# Patient Record
Sex: FEMALE | Race: BLACK OR AFRICAN AMERICAN | NOT HISPANIC OR LATINO | Employment: PART TIME | URBAN - METROPOLITAN AREA
[De-identification: names, ages, dates, MRNs, and addresses within clinical notes are randomized per-mention and may not be internally consistent; named-entity substitution may affect disease eponyms.]

---

## 2017-08-31 ENCOUNTER — HOSPITAL ENCOUNTER (EMERGENCY)
Facility: HOSPITAL | Age: 47
Discharge: HOME | End: 2017-08-31
Payer: COMMERCIAL

## 2017-08-31 VITALS
TEMPERATURE: 98 F | WEIGHT: 195 LBS | DIASTOLIC BLOOD PRESSURE: 95 MMHG | OXYGEN SATURATION: 97 % | HEIGHT: 62 IN | SYSTOLIC BLOOD PRESSURE: 170 MMHG | RESPIRATION RATE: 17 BRPM | HEART RATE: 85 BPM | BODY MASS INDEX: 35.88 KG/M2

## 2017-08-31 DIAGNOSIS — L25.9 CONTACT DERMATITIS, UNSPECIFIED CONTACT DERMATITIS TYPE, UNSPECIFIED TRIGGER: ICD-10-CM

## 2017-08-31 DIAGNOSIS — B35.8 TINEA FACIALE: Primary | ICD-10-CM

## 2017-08-31 PROCEDURE — 99283 EMERGENCY DEPT VISIT LOW MDM: CPT

## 2017-08-31 PROCEDURE — 63600175 PHARM REV CODE 636 W HCPCS: Performed by: NURSE PRACTITIONER

## 2017-08-31 PROCEDURE — 25000003 PHARM REV CODE 250: Performed by: NURSE PRACTITIONER

## 2017-08-31 RX ORDER — PREDNISONE 20 MG/1
20 TABLET ORAL
Status: COMPLETED | OUTPATIENT
Start: 2017-08-31 | End: 2017-08-31

## 2017-08-31 RX ORDER — PREDNISONE 20 MG/1
40 TABLET ORAL DAILY
Qty: 10 TABLET | Refills: 0 | Status: SHIPPED | OUTPATIENT
Start: 2017-08-31 | End: 2017-09-05

## 2017-08-31 RX ORDER — METFORMIN HYDROCHLORIDE 1000 MG/1
1000 TABLET ORAL 2 TIMES DAILY WITH MEALS
COMMUNITY

## 2017-08-31 RX ORDER — LISINOPRIL 30 MG/1
30 TABLET ORAL DAILY
COMMUNITY
End: 2018-11-29

## 2017-08-31 RX ORDER — TOLNAFTATE 10 MG/G
CREAM TOPICAL 2 TIMES DAILY
Qty: 28 G | Refills: 0 | Status: SHIPPED | OUTPATIENT
Start: 2017-08-31 | End: 2019-10-08

## 2017-08-31 RX ORDER — DIPHENHYDRAMINE HCL 25 MG
25 CAPSULE ORAL
Status: COMPLETED | OUTPATIENT
Start: 2017-08-31 | End: 2017-08-31

## 2017-08-31 RX ADMIN — DIPHENHYDRAMINE HYDROCHLORIDE 25 MG: 25 CAPSULE ORAL at 01:08

## 2017-08-31 RX ADMIN — PREDNISONE 20 MG: 20 TABLET ORAL at 01:08

## 2017-08-31 NOTE — ED NOTES
Patient identifiers verified and correct for Dixie Schultz.    LOC: The patient is awake, alert and aware of environment with an appropriate affect, the patient is oriented x 3 and speaking appropriately.  APPEARANCE: Patient resting comfortably and in no acute distress, patient is clean and well groomed, patient's clothing is properly fastened.  SKIN: The skin is warm and dry, color consistent with ethnicity, patient has normal skin turgor and moist mucus membranes, skin intact, no breakdown or bruising noted. Rash noted to the left side of the patients face.

## 2017-08-31 NOTE — ED PROVIDER NOTES
"Encounter Date: 8/31/2017       History     Chief Complaint   Patient presents with    rash to face     "I started with a rash to the left side of my face yesterday after wearing a headset at work, and today the rash is worse and spreading" c/o itching     42-year-old female reports rash around left ear from wearing gets it at work.  Patient states the area has been itching and red and seems to be spreading.  States several other coworkers were the same headset and have a similar rash.  Denies any ear pain.  Has not taken any medications for symptoms.  Denies any fever.          Review of patient's allergies indicates:  No Known Allergies  Past Medical History:   Diagnosis Date    Anemia     Diabetes mellitus     Hypertension      No past surgical history on file.  No family history on file.  Social History   Substance Use Topics    Smoking status: Never Smoker    Smokeless tobacco: Not on file    Alcohol use No     Review of Systems   Constitutional: Negative for fever.   HENT: Negative for sore throat.    Respiratory: Negative for shortness of breath.    Cardiovascular: Negative for chest pain.   Gastrointestinal: Negative for nausea.   Genitourinary: Negative for dysuria.   Musculoskeletal: Negative for back pain.   Skin: Positive for rash (allergic reaction to face).   Neurological: Negative for weakness.   Hematological: Does not bruise/bleed easily.   All other systems reviewed and are negative.      Physical Exam     Initial Vitals [08/31/17 1253]   BP Pulse Resp Temp SpO2   (!) 170/95 85 17 98 °F (36.7 °C) 97 %      MAP       120         Physical Exam    HENT:   Head:       Right Ear: Tympanic membrane and ear canal normal.   Left Ear: Tympanic membrane and ear canal normal.         ED Course   Procedures  Labs Reviewed - No data to display          Medical Decision Making:   Initial Assessment:   42-year-old female reports rash around left ear from wearing gets it at work.  Patient states the area " has been itching and red and seems to be spreading.  States several other coworkers were the same headset and have a similar rash.  Denies any ear pain.  Has not taken any medications for symptoms.  Denies any fever.  Hypopigmented erythematous papular rash to the left side of face.  TMs are normal  Differential Diagnosis:   ALLERGIC dermatitis, contact dermatitis, tinea infection  ED Management:  I will treat the patient with the antifungal cream and Benadryl and steroids.  Instructed to take medication as prescribed.  I also instructed the patient to clean the headset at work to prevent the spread of this infection.  Patient verbalized understanding.                   ED Course     Clinical Impression:   The primary encounter diagnosis was Tinea faciale. A diagnosis of Contact dermatitis, unspecified contact dermatitis type, unspecified trigger was also pertinent to this visit.    Disposition:   Disposition: Discharged  Condition: Stable                        Rebekah Otero NP  08/31/17 1864

## 2017-09-27 ENCOUNTER — HOSPITAL ENCOUNTER (EMERGENCY)
Facility: HOSPITAL | Age: 47
Discharge: HOME | End: 2017-09-27
Payer: COMMERCIAL

## 2017-09-27 VITALS
HEART RATE: 79 BPM | TEMPERATURE: 98 F | SYSTOLIC BLOOD PRESSURE: 148 MMHG | DIASTOLIC BLOOD PRESSURE: 75 MMHG | BODY MASS INDEX: 34.96 KG/M2 | WEIGHT: 190 LBS | RESPIRATION RATE: 18 BRPM | HEIGHT: 62 IN | OXYGEN SATURATION: 99 %
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
## 2017-09-27 DIAGNOSIS — L08.9 SKIN INFECTION: Primary | ICD-10-CM

## 2017-09-27 PROCEDURE — 99283 EMERGENCY DEPT VISIT LOW MDM: CPT

## 2017-09-27 RX ORDER — MUPIROCIN 20 MG/G
OINTMENT TOPICAL 2 TIMES DAILY
Qty: 15 G | Refills: 0 | Status: SHIPPED | OUTPATIENT
Start: 2017-09-27 | End: 2017-10-08

## 2017-09-27 RX ORDER — SULFAMETHOXAZOLE AND TRIMETHOPRIM 800; 160 MG/1; MG/1
1 TABLET ORAL 2 TIMES DAILY
Qty: 14 TABLET | Refills: 0 | Status: SHIPPED | OUTPATIENT
Start: 2017-09-27 | End: 2017-10-04

## 2017-09-27 RX ORDER — HYDROXYZINE HYDROCHLORIDE 25 MG/1
25 TABLET, FILM COATED ORAL EVERY 6 HOURS
Qty: 12 TABLET | Refills: 0 | Status: SHIPPED | OUTPATIENT
Start: 2017-09-27 | End: 2017-10-08

## 2017-09-28 NOTE — ED PROVIDER NOTES
Encounter Date: 2017       History     Chief Complaint   Patient presents with    Insect Bite     Pt states has had insect bites to left wrist, upper arm and next x 5 days.  Pt states + itching.      47-year-old female reports multiple insect bites to the left arm times one week.  Patient states she applied Neosporin to the area on her left wrist and noticed the area worsen and swelling and had a yellow drainage.  Patient denies fever.  The area is not tender to palpation but states it itches.  States no one is in the home has similar rash.          Review of patient's allergies indicates:  No Known Allergies  Past Medical History:   Diagnosis Date    Anemia     Diabetes mellitus     Hypertension      Past Surgical History:   Procedure Laterality Date    ANKLE SURGERY Right      SECTION       Family History   Problem Relation Age of Onset    No Known Problems Mother     No Known Problems Father      Social History   Substance Use Topics    Smoking status: Never Smoker    Smokeless tobacco: Never Used    Alcohol use No     Review of Systems   Constitutional: Negative for fever.   HENT: Negative for sore throat.    Respiratory: Negative for shortness of breath.    Cardiovascular: Negative for chest pain.   Gastrointestinal: Negative for nausea.   Genitourinary: Negative for dysuria.   Musculoskeletal: Negative for back pain.   Skin: Positive for rash.   Neurological: Negative for weakness.   Hematological: Does not bruise/bleed easily.   All other systems reviewed and are negative.      Physical Exam     Initial Vitals [17 1849]   BP Pulse Resp Temp SpO2   (!) 173/86 81 18 98.2 °F (36.8 °C) 99 %      MAP       115         Physical Exam    Nursing note and vitals reviewed.  Constitutional: She appears well-developed and well-nourished. No distress.   HENT:   Head: Normocephalic.   Eyes: Conjunctivae are normal.   Neck: Normal range of motion. Neck supple.   Cardiovascular: Normal rate.    Pulmonary/Chest: Breath sounds normal. No respiratory distress.   Abdominal: Soft. Bowel sounds are normal. She exhibits no distension and no abdominal bruit. There is no tenderness. There is no rebound and no guarding. No hernia.   Musculoskeletal:        Hands:  Neurological: She is alert and oriented to person, place, and time.   Skin: Skin is warm and dry.   Psychiatric: She has a normal mood and affect. Her behavior is normal. Judgment and thought content normal.         ED Course   Procedures  Labs Reviewed - No data to display          Medical Decision Making:   Initial Assessment:   47-year-old female reports multiple insect bites to the left arm times one week.  Patient states she applied Neosporin to the area on her left wrist and noticed the area worsen and swelling and had a yellow drainage.  Patient denies fever.  The area is not tender to palpation but states it itches.  States no one is in the home has similar rash.  Pustular rash to the left wrist.  Instructed bites to the left upper arm and forearm.  Patient is afebrile.  Otherwise does not appear to be in distress.  Differential Diagnosis:   Insect bites, scabies, cellulitis, abscess, shingles  ED Management:  The area in the left wrist appears to be an insect bites that are now infected.  I will treat the patient with topical antibiotic and a topical antibiotic.  Patient states she has been applying bleach to the area which is cause discoloration.  I instructed the patient to stop playing bridge of the area.  Follow-up primary care doctor for recheck in 3 days.  Take Tylenol and Motrin as needed for fever.  I'll prescribe hydroxyzine for itching.  Patient instructed to return to emergency room if any symptoms worsen.                   ED Course      Clinical Impression:   The primary encounter diagnosis was Skin infection. A diagnosis of Insect bite, initial encounter was also pertinent to this visit.    Disposition:   Disposition:  Discharged  Condition: Stable                        Rebekah Otero, JAYLEEN  10/05/17 1530

## 2017-10-08 ENCOUNTER — HOSPITAL ENCOUNTER (EMERGENCY)
Facility: HOSPITAL | Age: 47
Discharge: HOME | End: 2017-10-08
Attending: EMERGENCY MEDICINE
Payer: COMMERCIAL

## 2017-10-08 VITALS
TEMPERATURE: 99 F | DIASTOLIC BLOOD PRESSURE: 84 MMHG | BODY MASS INDEX: 34.96 KG/M2 | HEIGHT: 62 IN | RESPIRATION RATE: 18 BRPM | SYSTOLIC BLOOD PRESSURE: 183 MMHG | HEART RATE: 78 BPM | WEIGHT: 190 LBS | OXYGEN SATURATION: 98 %
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
## 2017-10-08 PROCEDURE — 99283 EMERGENCY DEPT VISIT LOW MDM: CPT

## 2017-10-08 RX ORDER — HYDROXYZINE HYDROCHLORIDE 25 MG/1
25 TABLET, FILM COATED ORAL 3 TIMES DAILY PRN
Qty: 10 TABLET | Refills: 0 | Status: SHIPPED | OUTPATIENT
Start: 2017-10-08

## 2017-10-08 RX ORDER — MUPIROCIN 20 MG/G
OINTMENT TOPICAL 2 TIMES DAILY
Qty: 15 G | Refills: 0 | OUTPATIENT
Start: 2017-10-08 | End: 2019-11-16

## 2017-10-12 NOTE — ED PROVIDER NOTES
"Encounter Date: 10/8/2017       History     Chief Complaint   Patient presents with    Insect Bite     reports multiple insect bites to hands and arms. no relief with cream and benadryl.     HPI   This is a 47 y.o. female who has a past medical history of Anemia; Diabetes mellitus; and Hypertension.   The patient presents to the Emergency Department with insect bites.  Pt states that she is "allergic" to mosquitos as they create bumps on her skin when they bite her.  She states they have been biting her more over the past few days.  Bites are itching, not painful and bothersome to the patient.  Symptoms are not associated with anything including myalgias, neck pain or headache.   They are aggravated by scratching and not relieved by anything.  Pt has a past surgical history that includes  section and Ankle surgery (Right).      Review of patient's allergies indicates:  No Known Allergies  Past Medical History:   Diagnosis Date    Anemia     Diabetes mellitus     Hypertension      Past Surgical History:   Procedure Laterality Date    ANKLE SURGERY Right      SECTION       Family History   Problem Relation Age of Onset    No Known Problems Mother     No Known Problems Father      Social History   Substance Use Topics    Smoking status: Never Smoker    Smokeless tobacco: Never Used    Alcohol use No     Review of Systems   Constitutional: Negative for activity change and fever.   Musculoskeletal: Negative for arthralgias, myalgias, neck pain and neck stiffness.   Skin: Positive for rash. Negative for wound.   Neurological: Negative for headaches.       Physical Exam     Initial Vitals [10/08/17 1714]   BP Pulse Resp Temp SpO2   (!) 183/84 78 18 98.6 °F (37 °C) 98 %      MAP       117         Physical Exam    Nursing note and vitals reviewed.  Constitutional: She appears well-developed and well-nourished. No distress.   Cardiovascular: Normal rate.   Pulmonary/Chest: No respiratory distress. "   Musculoskeletal: Normal range of motion. She exhibits no tenderness.   Neurological: She is alert and oriented to person, place, and time. She has normal strength.   Skin: Skin is warm and dry.   Pt has scattered small subcentimeter nodules which are minimally erythematous, mildly indurated, raised, nontender. No fluctuance, discharge or bleeding. They are all pruritic with some excoriations noted.         ED Course   Procedures  Labs Reviewed - No data to display          Medical Decision Making:   Initial Assessment:   Pt presents for itching mosquito bites. Explained that she is not allergic but this is normal. Advised to decrease risk of bite. Will give Rx for atarax as needed for itching.                   ED Course      Clinical Impression:     1. Mosquito bite, initial encounter                                 Aaron Simms MD  10/12/17 3539

## 2018-03-23 DIAGNOSIS — Z12.31 SCREENING MAMMOGRAM, ENCOUNTER FOR: Primary | ICD-10-CM

## 2018-04-03 ENCOUNTER — HOSPITAL ENCOUNTER (OUTPATIENT)
Dept: RADIOLOGY | Facility: HOSPITAL | Age: 48
Discharge: HOME | End: 2018-04-03
Attending: INTERNAL MEDICINE
Payer: COMMERCIAL

## 2018-04-03 DIAGNOSIS — Z12.31 SCREENING MAMMOGRAM, ENCOUNTER FOR: ICD-10-CM

## 2018-04-03 PROCEDURE — 77067 SCR MAMMO BI INCL CAD: CPT | Mod: TC,PO

## 2018-04-05 ENCOUNTER — LAB VISIT (OUTPATIENT)
Dept: LAB | Facility: HOSPITAL | Age: 48
End: 2018-04-05
Attending: NURSE PRACTITIONER
Payer: COMMERCIAL

## 2018-04-05 DIAGNOSIS — E11.65 TYPE 2 DIABETES MELLITUS WITH HYPERGLYCEMIA: Primary | ICD-10-CM

## 2018-04-05 DIAGNOSIS — I10 ESSENTIAL (PRIMARY) HYPERTENSION: ICD-10-CM

## 2018-04-05 LAB
ALBUMIN SERPL BCP-MCNC: 3.9 G/DL
ALP SERPL-CCNC: 76 U/L
ALT SERPL W/O P-5'-P-CCNC: 25 U/L
ANION GAP SERPL CALC-SCNC: 9 MMOL/L
AST SERPL-CCNC: 23 U/L
BASOPHILS # BLD AUTO: 0.02 K/UL
BASOPHILS NFR BLD: 0.3 %
BILIRUB SERPL-MCNC: 0.3 MG/DL
BILIRUB UR QL STRIP: NEGATIVE
BUN SERPL-MCNC: 18 MG/DL
CALCIUM SERPL-MCNC: 9.1 MG/DL
CHLORIDE SERPL-SCNC: 100 MMOL/L
CHOLEST SERPL-MCNC: 160 MG/DL
CHOLEST/HDLC SERPL: 3.1 {RATIO}
CLARITY UR REFRACT.AUTO: CLEAR
CO2 SERPL-SCNC: 33 MMOL/L
COLOR UR AUTO: YELLOW
CREAT SERPL-MCNC: 0.86 MG/DL
CREAT UR-MCNC: 100 MG/DL
DIFFERENTIAL METHOD: ABNORMAL
EOSINOPHIL # BLD AUTO: 0.1 K/UL
EOSINOPHIL NFR BLD: 2.1 %
ERYTHROCYTE [DISTWIDTH] IN BLOOD BY AUTOMATED COUNT: 14.2 %
EST. GFR  (AFRICAN AMERICAN): >60 ML/MIN/1.73 M^2
EST. GFR  (NON AFRICAN AMERICAN): >60 ML/MIN/1.73 M^2
ESTIMATED AVG GLUCOSE: 126 MG/DL
GLUCOSE SERPL-MCNC: 114 MG/DL
GLUCOSE UR QL STRIP: NEGATIVE
HBA1C MFR BLD HPLC: 6 %
HCT VFR BLD AUTO: 37.5 %
HDLC SERPL-MCNC: 51 MG/DL
HDLC SERPL: 31.9 %
HGB BLD-MCNC: 11.5 G/DL
HGB UR QL STRIP: NEGATIVE
KETONES UR QL STRIP: NEGATIVE
LDLC SERPL CALC-MCNC: 101.6 MG/DL
LEUKOCYTE ESTERASE UR QL STRIP: NEGATIVE
LYMPHOCYTES # BLD AUTO: 1.8 K/UL
LYMPHOCYTES NFR BLD: 31 %
MCH RBC QN AUTO: 25.6 PG
MCHC RBC AUTO-ENTMCNC: 30.7 G/DL
MCV RBC AUTO: 83 FL
MICROALBUMIN UR DL<=1MG/L-MCNC: 4 UG/ML
MICROALBUMIN/CREATININE RATIO: 4 UG/MG
MONOCYTES # BLD AUTO: 0.5 K/UL
MONOCYTES NFR BLD: 8.3 %
NEUTROPHILS # BLD AUTO: 3.4 K/UL
NEUTROPHILS NFR BLD: 58.1 %
NITRITE UR QL STRIP: NEGATIVE
NONHDLC SERPL-MCNC: 109 MG/DL
PH UR STRIP: 7 [PH] (ref 5–8)
PLATELET # BLD AUTO: 216 K/UL
PMV BLD AUTO: 12.7 FL
POTASSIUM SERPL-SCNC: 3.9 MMOL/L
PROT SERPL-MCNC: 7.2 G/DL
PROT UR QL STRIP: NEGATIVE
RBC # BLD AUTO: 4.5 M/UL
SODIUM SERPL-SCNC: 142 MMOL/L
SP GR UR STRIP: 1 (ref 1–1.03)
TRIGL SERPL-MCNC: 37 MG/DL
TSH SERPL DL<=0.005 MIU/L-ACNC: 2.32 UIU/ML
URN SPEC COLLECT METH UR: NORMAL
UROBILINOGEN UR STRIP-ACNC: NEGATIVE EU/DL
WBC # BLD AUTO: 5.78 K/UL

## 2018-04-05 PROCEDURE — 36415 COLL VENOUS BLD VENIPUNCTURE: CPT | Mod: PO

## 2018-04-05 PROCEDURE — 80061 LIPID PANEL: CPT

## 2018-04-05 PROCEDURE — 84443 ASSAY THYROID STIM HORMONE: CPT | Mod: PO

## 2018-04-05 PROCEDURE — 82043 UR ALBUMIN QUANTITATIVE: CPT | Mod: PO

## 2018-04-05 PROCEDURE — 83036 HEMOGLOBIN GLYCOSYLATED A1C: CPT

## 2018-04-05 PROCEDURE — 80053 COMPREHEN METABOLIC PANEL: CPT | Mod: PO

## 2018-04-05 PROCEDURE — 85025 COMPLETE CBC W/AUTO DIFF WBC: CPT | Mod: PO

## 2018-04-05 PROCEDURE — 81003 URINALYSIS AUTO W/O SCOPE: CPT | Mod: PO

## 2018-05-14 ENCOUNTER — HOSPITAL ENCOUNTER (EMERGENCY)
Facility: HOSPITAL | Age: 48
Discharge: HOME | End: 2018-05-14
Attending: EMERGENCY MEDICINE
Payer: MEDICAID

## 2018-05-14 VITALS
TEMPERATURE: 99 F | HEIGHT: 62 IN | RESPIRATION RATE: 18 BRPM | OXYGEN SATURATION: 98 % | WEIGHT: 190 LBS | SYSTOLIC BLOOD PRESSURE: 138 MMHG | DIASTOLIC BLOOD PRESSURE: 86 MMHG | BODY MASS INDEX: 34.96 KG/M2 | HEART RATE: 80 BPM

## 2018-05-14 DIAGNOSIS — R05.9 COUGH: Primary | ICD-10-CM

## 2018-05-14 LAB — DEPRECATED S PYO AG THROAT QL EIA: NEGATIVE

## 2018-05-14 PROCEDURE — 99284 EMERGENCY DEPT VISIT MOD MDM: CPT

## 2018-05-14 PROCEDURE — 87081 CULTURE SCREEN ONLY: CPT

## 2018-05-14 PROCEDURE — 87880 STREP A ASSAY W/OPTIC: CPT

## 2018-05-14 RX ORDER — PROMETHAZINE HYDROCHLORIDE AND CODEINE PHOSPHATE 6.25; 1 MG/5ML; MG/5ML
5 SOLUTION ORAL EVERY 4 HOURS PRN
Qty: 118 ML | Refills: 0 | Status: SHIPPED | OUTPATIENT
Start: 2018-05-14 | End: 2018-05-24

## 2018-05-14 RX ORDER — AZITHROMYCIN 250 MG/1
500 TABLET, FILM COATED ORAL DAILY
Qty: 6 TABLET | Refills: 0 | Status: SHIPPED | OUTPATIENT
Start: 2018-05-14

## 2018-05-14 RX ORDER — AMLODIPINE BESYLATE 10 MG/1
10 TABLET ORAL DAILY
COMMUNITY

## 2018-05-14 RX ORDER — FLUTICASONE PROPIONATE 50 MCG
1 SPRAY, SUSPENSION (ML) NASAL 2 TIMES DAILY PRN
Qty: 15 G | Refills: 0 | Status: SHIPPED | OUTPATIENT
Start: 2018-05-14

## 2018-05-14 NOTE — ED PROVIDER NOTES
Encounter Date: 2018       History     Chief Complaint   Patient presents with    Cough     Pt states has had cough x 5 days, states over the weekend started with pain with coughing and sore throat.       Patient comes emergency Department complaining of cough, sore throat and nasal congestion.  Cough is nonproductive.  She denies chest pain abdominal pain or back pain.  Patient denies neck pain or stiffness or headache.          Review of patient's allergies indicates:  No Known Allergies  Past Medical History:   Diagnosis Date    Anemia     Diabetes mellitus     Hypertension      Past Surgical History:   Procedure Laterality Date    ANKLE SURGERY Right      SECTION       Family History   Problem Relation Age of Onset    Breast cancer Mother     No Known Problems Father     Breast cancer Paternal Aunt      Social History   Substance Use Topics    Smoking status: Never Smoker    Smokeless tobacco: Never Used    Alcohol use No     Review of Systems   Constitutional: Negative for fatigue and fever.   HENT: Positive for congestion, rhinorrhea, sneezing and sore throat.    Respiratory: Positive for cough. Negative for chest tightness and shortness of breath.    Cardiovascular: Negative for chest pain, palpitations and leg swelling.   Gastrointestinal: Negative for abdominal pain, diarrhea, nausea and vomiting.   Genitourinary: Negative for dysuria.   Musculoskeletal: Negative for back pain.   Skin: Negative for rash.   Neurological: Negative for weakness.   Hematological: Does not bruise/bleed easily.   All other systems reviewed and are negative.      Physical Exam     Initial Vitals [18 0859]   BP Pulse Resp Temp SpO2   (!) 141/87 93 18 98.1 °F (36.7 °C) 98 %      MAP       105         Physical Exam    Nursing note and vitals reviewed.  Constitutional: Vital signs are normal. She appears well-developed and well-nourished. She is not diaphoretic. No distress.   HENT:   Head: Normocephalic  and atraumatic.   Eyes: Conjunctivae and EOM are normal. Pupils are equal, round, and reactive to light.   Neck: Normal range of motion. Neck supple.   Cardiovascular: Normal rate, regular rhythm and normal heart sounds.   Pulmonary/Chest: Breath sounds normal. No respiratory distress. She has no wheezes. She has no rhonchi. She has no rales.   Abdominal: Soft. She exhibits no distension. There is no tenderness. There is no rebound and no guarding.   Musculoskeletal: Normal range of motion. She exhibits no edema or tenderness.   Neurological: She is alert and oriented to person, place, and time.   Skin: Skin is warm and dry.   Psychiatric: She has a normal mood and affect.         ED Course   Procedures  Labs Reviewed   THROAT SCREEN, RAPID   CULTURE, STREP A,  THROAT                                  Clinical Impression:   The encounter diagnosis was Cough.    Disposition:   Disposition: Discharged  Condition: Stable                        Jude Allen MD  05/14/18 1239

## 2018-05-17 LAB — BACTERIA THROAT CULT: NORMAL

## 2018-07-15 ENCOUNTER — HOSPITAL ENCOUNTER (EMERGENCY)
Facility: HOSPITAL | Age: 48
Discharge: HOME | End: 2018-07-15
Attending: FAMILY MEDICINE
Payer: MEDICAID

## 2018-07-15 VITALS
BODY MASS INDEX: 34.78 KG/M2 | HEART RATE: 80 BPM | WEIGHT: 189 LBS | OXYGEN SATURATION: 100 % | DIASTOLIC BLOOD PRESSURE: 67 MMHG | SYSTOLIC BLOOD PRESSURE: 135 MMHG | TEMPERATURE: 98 F | HEIGHT: 62 IN | RESPIRATION RATE: 18 BRPM

## 2018-07-15 DIAGNOSIS — R00.2 PALPITATION: Primary | ICD-10-CM

## 2018-07-15 DIAGNOSIS — R07.9 CHEST PAIN: ICD-10-CM

## 2018-07-15 LAB
ALBUMIN SERPL BCP-MCNC: 4 G/DL
ALP SERPL-CCNC: 63 U/L
ALT SERPL W/O P-5'-P-CCNC: 14 U/L
ANION GAP SERPL CALC-SCNC: 9 MMOL/L
AST SERPL-CCNC: 22 U/L
BASOPHILS # BLD AUTO: 0.03 K/UL
BASOPHILS NFR BLD: 0.4 %
BILIRUB SERPL-MCNC: 0.5 MG/DL
BUN SERPL-MCNC: 13 MG/DL
CALCIUM SERPL-MCNC: 8.7 MG/DL
CHLORIDE SERPL-SCNC: 105 MMOL/L
CO2 SERPL-SCNC: 27 MMOL/L
CREAT SERPL-MCNC: 0.71 MG/DL
DIFFERENTIAL METHOD: ABNORMAL
EOSINOPHIL # BLD AUTO: 0.1 K/UL
EOSINOPHIL NFR BLD: 1.3 %
ERYTHROCYTE [DISTWIDTH] IN BLOOD BY AUTOMATED COUNT: 14.5 %
EST. GFR  (AFRICAN AMERICAN): >60 ML/MIN/1.73 M^2
EST. GFR  (NON AFRICAN AMERICAN): >60 ML/MIN/1.73 M^2
GLUCOSE SERPL-MCNC: 106 MG/DL
HCT VFR BLD AUTO: 35.3 %
HGB BLD-MCNC: 11.4 G/DL
LYMPHOCYTES # BLD AUTO: 2.6 K/UL
LYMPHOCYTES NFR BLD: 31.4 %
MCH RBC QN AUTO: 26.5 PG
MCHC RBC AUTO-ENTMCNC: 32.3 G/DL
MCV RBC AUTO: 82 FL
MONOCYTES # BLD AUTO: 0.7 K/UL
MONOCYTES NFR BLD: 8.9 %
NEUTROPHILS # BLD AUTO: 4.8 K/UL
NEUTROPHILS NFR BLD: 57.8 %
PLATELET # BLD AUTO: 246 K/UL
PMV BLD AUTO: 11.8 FL
POTASSIUM SERPL-SCNC: 3.7 MMOL/L
PROT SERPL-MCNC: 7.6 G/DL
RBC # BLD AUTO: 4.3 M/UL
SODIUM SERPL-SCNC: 141 MMOL/L
TROPONIN I SERPL DL<=0.01 NG/ML-MCNC: <0.012 NG/ML
WBC # BLD AUTO: 8.31 K/UL

## 2018-07-15 PROCEDURE — 96361 HYDRATE IV INFUSION ADD-ON: CPT

## 2018-07-15 PROCEDURE — 96375 TX/PRO/DX INJ NEW DRUG ADDON: CPT

## 2018-07-15 PROCEDURE — 80053 COMPREHEN METABOLIC PANEL: CPT

## 2018-07-15 PROCEDURE — 94760 N-INVAS EAR/PLS OXIMETRY 1: CPT

## 2018-07-15 PROCEDURE — 96374 THER/PROPH/DIAG INJ IV PUSH: CPT

## 2018-07-15 PROCEDURE — 25000003 PHARM REV CODE 250: Performed by: FAMILY MEDICINE

## 2018-07-15 PROCEDURE — 99284 EMERGENCY DEPT VISIT MOD MDM: CPT | Mod: 25

## 2018-07-15 PROCEDURE — 63600175 PHARM REV CODE 636 W HCPCS: Performed by: FAMILY MEDICINE

## 2018-07-15 PROCEDURE — 93010 ELECTROCARDIOGRAM REPORT: CPT | Mod: ,,, | Performed by: INTERNAL MEDICINE

## 2018-07-15 PROCEDURE — 85025 COMPLETE CBC W/AUTO DIFF WBC: CPT

## 2018-07-15 PROCEDURE — 84484 ASSAY OF TROPONIN QUANT: CPT

## 2018-07-15 PROCEDURE — 93005 ELECTROCARDIOGRAM TRACING: CPT

## 2018-07-15 RX ORDER — METOPROLOL TARTRATE 1 MG/ML
5 INJECTION, SOLUTION INTRAVENOUS
Status: COMPLETED | OUTPATIENT
Start: 2018-07-15 | End: 2018-07-15

## 2018-07-15 RX ORDER — HYDRALAZINE HYDROCHLORIDE 20 MG/ML
10 INJECTION INTRAMUSCULAR; INTRAVENOUS
Status: COMPLETED | OUTPATIENT
Start: 2018-07-15 | End: 2018-07-15

## 2018-07-15 RX ADMIN — HYDRALAZINE HYDROCHLORIDE 10 MG: 20 INJECTION INTRAMUSCULAR; INTRAVENOUS at 03:07

## 2018-07-15 RX ADMIN — METOPROLOL TARTRATE 5 MG: 1 INJECTION, SOLUTION INTRAVENOUS at 02:07

## 2018-07-15 RX ADMIN — SODIUM CHLORIDE 500 ML: 0.9 INJECTION, SOLUTION INTRAVENOUS at 02:07

## 2018-07-15 NOTE — ED PROVIDER NOTES
"Encounter Date: 7/15/2018       History     Chief Complaint   Patient presents with    Palpitations     Pt states "I feel like my chest is racing and I can't catch my breath."  Pt states has had sensation x 3-4 days.  Pt states sensations "comes and goes", states chest feels tight when "feels racing" sensation.       48-year-old female patient who states that she has palpitations with her heart racing and feeling as if she isn't have her heart pop out of her chest.  Patient otherwise has no history of abnormal heart rate or any other findings consistent with heart issues other than hypertension.  Patient claims she has taken her medicine and has not missed any doses.          Review of patient's allergies indicates:  No Known Allergies  Past Medical History:   Diagnosis Date    Anemia     Diabetes mellitus     Hypertension      Past Surgical History:   Procedure Laterality Date    ANKLE SURGERY Right      SECTION       Family History   Problem Relation Age of Onset    Breast cancer Mother     No Known Problems Father     Breast cancer Paternal Aunt      Social History   Substance Use Topics    Smoking status: Never Smoker    Smokeless tobacco: Never Used    Alcohol use No     Review of Systems   Constitutional: Negative for fever.   HENT: Negative for sore throat.    Respiratory: Negative for shortness of breath.    Cardiovascular: Positive for palpitations. Negative for chest pain.   Gastrointestinal: Negative for nausea.   Genitourinary: Negative for dysuria.   Musculoskeletal: Negative for back pain.   Skin: Negative for rash.   Neurological: Negative for weakness.   Hematological: Does not bruise/bleed easily.   All other systems reviewed and are negative.      Physical Exam     Initial Vitals [07/15/18 1408]   BP Pulse Resp Temp SpO2   (!) 177/84 102 18 98.2 °F (36.8 °C) 100 %      MAP       --         Physical Exam    Nursing note and vitals reviewed.  Constitutional: She appears " well-developed.   HENT:   Head: Normocephalic and atraumatic.   Right Ear: External ear normal.   Left Ear: External ear normal.   Nose: Nose normal.   Mouth/Throat: Oropharynx is clear and moist.   Eyes: Conjunctivae and EOM are normal. Pupils are equal, round, and reactive to light. Right eye exhibits no discharge. Left eye exhibits no discharge.   Neck: Normal range of motion. Neck supple. No tracheal deviation present.   Cardiovascular: Regular rhythm and normal heart sounds.   No murmur heard.  Sinus tachycardia   Pulmonary/Chest: Breath sounds normal. No respiratory distress. She has no wheezes.   Abdominal: Soft. Bowel sounds are normal.   Neurological: She is alert and oriented to person, place, and time.   Skin: Skin is warm and dry.         ED Course   Procedures  Labs Reviewed   CBC W/ AUTO DIFFERENTIAL - Abnormal; Notable for the following:        Result Value    Hemoglobin 11.4 (*)     Hematocrit 35.3 (*)     MCH 26.5 (*)     All other components within normal limits   COMPREHENSIVE METABOLIC PANEL   TROPONIN I     EKG Readings: (Independently Interpreted)   EKG shows sinus tachycardia rate of 110 with no ectopy normal conduction no ST segment elevation or depression or T-wave inversion no signs of STEMI       Imaging Results          X-Ray Chest AP Portable (Final result)  Result time 07/15/18 15:12:33    Final result by Kilo Call MD (07/15/18 15:12:33)                 Impression:      Negative      Electronically signed by: Kilo Call MD  Date:    07/15/2018  Time:    15:12             Narrative:    EXAMINATION:  XR CHEST AP PORTABLE    CLINICAL HISTORY:  Chest Pain;    COMPARISON:  None    FINDINGS:  Normal heart size. Clear lungs.                              X-Rays:   Independently Interpreted Readings:   Other Readings:  Xray reviewed by myself and is negative. Awaiting radiology report.      Medical Decision Making:   Initial Assessment:   Patient sitting in no distress and pleasant.  Patient has no other complaints other than documented.     Differential Diagnosis:   Angina, unstable angina, hypertension, hypertension urgency, hypertension emergency, myocardial infarction                        Clinical Impression:   The primary encounter diagnosis was Palpitation. A diagnosis of Chest pain was also pertinent to this visit.                             Orlando Beck MD  07/15/18 6612

## 2018-11-29 ENCOUNTER — HOSPITAL ENCOUNTER (EMERGENCY)
Facility: HOSPITAL | Age: 48
Discharge: HOME | End: 2018-11-29
Attending: FAMILY MEDICINE
Payer: MEDICAID

## 2018-11-29 VITALS
HEIGHT: 64 IN | RESPIRATION RATE: 18 BRPM | SYSTOLIC BLOOD PRESSURE: 128 MMHG | BODY MASS INDEX: 32.97 KG/M2 | WEIGHT: 193.13 LBS | HEART RATE: 83 BPM | OXYGEN SATURATION: 100 % | DIASTOLIC BLOOD PRESSURE: 73 MMHG | TEMPERATURE: 98 F

## 2018-11-29 DIAGNOSIS — T50.905A MEDICATION REACTION, INITIAL ENCOUNTER: Primary | ICD-10-CM

## 2018-11-29 DIAGNOSIS — T78.3XXA ANGIOEDEMA OF LIPS, INITIAL ENCOUNTER: ICD-10-CM

## 2018-11-29 DIAGNOSIS — I10 ESSENTIAL HYPERTENSION: ICD-10-CM

## 2018-11-29 LAB
ALBUMIN SERPL BCP-MCNC: 4 G/DL
ALP SERPL-CCNC: 65 U/L
ALT SERPL W/O P-5'-P-CCNC: 14 U/L
ANION GAP SERPL CALC-SCNC: 9 MMOL/L
AST SERPL-CCNC: 19 U/L
BASOPHILS # BLD AUTO: 0.02 K/UL
BASOPHILS NFR BLD: 0.3 %
BILIRUB SERPL-MCNC: 0.3 MG/DL
BUN SERPL-MCNC: 17 MG/DL
CALCIUM SERPL-MCNC: 9.3 MG/DL
CHLORIDE SERPL-SCNC: 100 MMOL/L
CO2 SERPL-SCNC: 27 MMOL/L
CREAT SERPL-MCNC: 0.79 MG/DL
DIFFERENTIAL METHOD: ABNORMAL
EOSINOPHIL # BLD AUTO: 0.2 K/UL
EOSINOPHIL NFR BLD: 2.3 %
ERYTHROCYTE [DISTWIDTH] IN BLOOD BY AUTOMATED COUNT: 14.1 %
EST. GFR  (AFRICAN AMERICAN): >60 ML/MIN/1.73 M^2
EST. GFR  (NON AFRICAN AMERICAN): >60 ML/MIN/1.73 M^2
GLUCOSE SERPL-MCNC: 94 MG/DL
HCT VFR BLD AUTO: 33.9 %
HGB BLD-MCNC: 10.9 G/DL
LYMPHOCYTES # BLD AUTO: 2.1 K/UL
LYMPHOCYTES NFR BLD: 30.1 %
MCH RBC QN AUTO: 26.5 PG
MCHC RBC AUTO-ENTMCNC: 32.2 G/DL
MCV RBC AUTO: 82 FL
MONOCYTES # BLD AUTO: 0.6 K/UL
MONOCYTES NFR BLD: 9.2 %
NEUTROPHILS # BLD AUTO: 4.1 K/UL
NEUTROPHILS NFR BLD: 58 %
PLATELET # BLD AUTO: 236 K/UL
PMV BLD AUTO: 12.4 FL
POTASSIUM SERPL-SCNC: 3.4 MMOL/L
PROT SERPL-MCNC: 7.5 G/DL
RBC # BLD AUTO: 4.12 M/UL
SODIUM SERPL-SCNC: 136 MMOL/L
WBC # BLD AUTO: 6.98 K/UL

## 2018-11-29 PROCEDURE — 96375 TX/PRO/DX INJ NEW DRUG ADDON: CPT

## 2018-11-29 PROCEDURE — 63600175 PHARM REV CODE 636 W HCPCS: Performed by: FAMILY MEDICINE

## 2018-11-29 PROCEDURE — 80053 COMPREHEN METABOLIC PANEL: CPT

## 2018-11-29 PROCEDURE — 99284 EMERGENCY DEPT VISIT MOD MDM: CPT | Mod: 25

## 2018-11-29 PROCEDURE — S0028 INJECTION, FAMOTIDINE, 20 MG: HCPCS | Performed by: FAMILY MEDICINE

## 2018-11-29 PROCEDURE — 96374 THER/PROPH/DIAG INJ IV PUSH: CPT

## 2018-11-29 PROCEDURE — 85025 COMPLETE CBC W/AUTO DIFF WBC: CPT

## 2018-11-29 PROCEDURE — 25000003 PHARM REV CODE 250: Performed by: FAMILY MEDICINE

## 2018-11-29 RX ORDER — FAMOTIDINE 40 MG/1
40 TABLET, FILM COATED ORAL DAILY
Qty: 30 TABLET | Refills: 0 | Status: SHIPPED | OUTPATIENT
Start: 2018-11-29 | End: 2019-11-29

## 2018-11-29 RX ORDER — DIPHENHYDRAMINE HYDROCHLORIDE 50 MG/ML
25 INJECTION INTRAMUSCULAR; INTRAVENOUS
Status: COMPLETED | OUTPATIENT
Start: 2018-11-29 | End: 2018-11-29

## 2018-11-29 RX ORDER — FAMOTIDINE 10 MG/ML
20 INJECTION INTRAVENOUS 2 TIMES DAILY
Status: DISCONTINUED | OUTPATIENT
Start: 2018-11-29 | End: 2018-11-29 | Stop reason: HOSPADM

## 2018-11-29 RX ORDER — METHYLPREDNISOLONE SOD SUCC 125 MG
125 VIAL (EA) INJECTION
Status: COMPLETED | OUTPATIENT
Start: 2018-11-29 | End: 2018-11-29

## 2018-11-29 RX ORDER — PREDNISONE 20 MG/1
40 TABLET ORAL DAILY
Qty: 10 TABLET | Refills: 0 | Status: SHIPPED | OUTPATIENT
Start: 2018-11-29 | End: 2018-12-04

## 2018-11-29 RX ORDER — HYDRALAZINE HYDROCHLORIDE 25 MG/1
25 TABLET, FILM COATED ORAL EVERY 12 HOURS
Qty: 60 TABLET | Refills: 0 | Status: SHIPPED | OUTPATIENT
Start: 2018-11-29 | End: 2020-04-22

## 2018-11-29 RX ORDER — DIPHENHYDRAMINE HCL 25 MG
25 CAPSULE ORAL EVERY 6 HOURS PRN
Qty: 30 CAPSULE | Refills: 0 | Status: SHIPPED | OUTPATIENT
Start: 2018-11-29

## 2018-11-29 RX ADMIN — FAMOTIDINE 20 MG: 10 INJECTION INTRAVENOUS at 10:11

## 2018-11-29 RX ADMIN — METHYLPREDNISOLONE SODIUM SUCCINATE 125 MG: 125 INJECTION, POWDER, FOR SOLUTION INTRAMUSCULAR; INTRAVENOUS at 10:11

## 2018-11-29 RX ADMIN — DIPHENHYDRAMINE HYDROCHLORIDE 25 MG: 50 INJECTION, SOLUTION INTRAMUSCULAR; INTRAVENOUS at 10:11

## 2018-11-29 NOTE — ED PROVIDER NOTES
Encounter Date: 2018       History     Chief Complaint   Patient presents with    Allergic Reaction     48-year-old female complains of lower lip swelling without a reason since this morning.  Patient denies tongue or throat swelling. No wheezing or shortness of breath. No chest pain or abdominal pain.  Patient does take lisinopril for her blood pressure.      The history is provided by the patient.     Review of patient's allergies indicates:  No Known Allergies  Past Medical History:   Diagnosis Date    Anemia     Diabetes mellitus     Hypertension      Past Surgical History:   Procedure Laterality Date    ANKLE SURGERY Right      SECTION       Family History   Problem Relation Age of Onset    Breast cancer Mother     No Known Problems Father     Breast cancer Paternal Aunt      Social History     Tobacco Use    Smoking status: Never Smoker    Smokeless tobacco: Never Used   Substance Use Topics    Alcohol use: No    Drug use: No     Review of Systems   Constitutional: Negative for activity change, appetite change, chills and fever.   HENT: Negative for congestion, ear discharge, rhinorrhea, sinus pressure, sinus pain, sore throat and trouble swallowing.    Eyes: Negative for photophobia, pain, discharge, redness, itching and visual disturbance.   Respiratory: Negative for cough, chest tightness, shortness of breath and wheezing.    Cardiovascular: Negative for chest pain, palpitations and leg swelling.   Gastrointestinal: Negative for abdominal distention, abdominal pain, constipation, diarrhea, nausea and vomiting.   Genitourinary: Negative for dysuria, flank pain, frequency and hematuria.   Musculoskeletal: Negative for back pain, gait problem, neck pain and neck stiffness.   Skin: Negative for rash and wound.   Neurological: Negative for dizziness, tremors, seizures, syncope, speech difficulty, weakness, light-headedness, numbness and headaches.   Psychiatric/Behavioral: Negative for  behavioral problems, confusion, hallucinations and sleep disturbance. The patient is not nervous/anxious.    All other systems reviewed and are negative.      Physical Exam     Initial Vitals [11/29/18 1022]   BP Pulse Resp Temp SpO2   (!) 153/82 93 18 98.5 °F (36.9 °C) 100 %      MAP       --         Physical Exam    Nursing note and vitals reviewed.  Constitutional: Vital signs are normal. She appears well-developed and well-nourished. She is active.   HENT:   Head: Normocephalic and atraumatic.   Nose: Nose normal.   Mouth/Throat: Oropharynx is clear and moist.       Significant swelling of lower lip without any tongue or throat swelling.   Eyes: Conjunctivae and lids are normal.   Neck: Trachea normal, normal range of motion and full passive range of motion without pain. Neck supple. Normal range of motion present. No neck rigidity.   Cardiovascular: Normal rate, regular rhythm, S1 normal, S2 normal, normal heart sounds, intact distal pulses and normal pulses.   Pulmonary/Chest: Breath sounds normal. No respiratory distress. She has no wheezes. She has no rhonchi. She has no rales.   Abdominal: Soft. Normal appearance and bowel sounds are normal. She exhibits no distension. There is no tenderness.   Musculoskeletal: Normal range of motion.   Lymphadenopathy:     She has no cervical adenopathy.   Neurological: She is alert and oriented to person, place, and time. She has normal reflexes. No cranial nerve deficit or sensory deficit. GCS score is 15. GCS eye subscore is 4. GCS verbal subscore is 5. GCS motor subscore is 6.   Skin: Skin is warm and intact. Capillary refill takes less than 2 seconds. No abrasion, no bruising and no rash noted.   Psychiatric: She has a normal mood and affect. Her speech is normal and behavior is normal. Judgment and thought content normal. She is not actively hallucinating. Cognition and memory are normal. She is attentive.         ED Course   Procedures  Labs Reviewed   CBC W/ AUTO  DIFFERENTIAL - Abnormal; Notable for the following components:       Result Value    Hemoglobin 10.9 (*)     Hematocrit 33.9 (*)     MCH 26.5 (*)     All other components within normal limits   COMPREHENSIVE METABOLIC PANEL - Abnormal; Notable for the following components:    Potassium 3.4 (*)     All other components within normal limits          Imaging Results    None          Medical Decision Making:   Initial Assessment:   48-year-old female presents to ER with left lower lip swelling since this morning gradually progressing.  No tongue or pharyngeal swelling. No shortness of breath or wheezing.  Patient takes lisinopril.  Differential Diagnosis:   Allergic reaction, anaphylactic reaction, food allergy.  Medication allergy.  Clinical Tests:   Lab Tests: Ordered and Reviewed  ED Management:  Patient's symptoms most likely secondary to angioedema of lisinopril for which she has been given prednisone, Benadryl, Pepcid in the ER.  Patient is kept watching in the ER and the symptoms did not show no progress.  She has been prescribed prednisone and advised to continue Benadryl and Pepcid at home.  Patient is educated on angina neurotic edema from lisinopril and advised never take again.  Patient will be started on hydralazine for her blood pressure and advised to follow up with the primary care physician for monitoring and titrating.  Advised to follow up ER with any severe swelling in her lip progressing to her tongue or throat immediately to the ER.                      Clinical Impression:   The primary encounter diagnosis was Medication reaction, initial encounter. Diagnoses of Angioedema of lips, initial encounter and Essential hypertension were also pertinent to this visit.      Disposition:   Disposition: Discharged  Condition: Isak Wolf MD  11/29/18 9211

## 2019-06-27 ENCOUNTER — TELEPHONE (OUTPATIENT)
Dept: CARDIOLOGY | Facility: CLINIC | Age: 49
End: 2019-06-27

## 2019-06-27 NOTE — TELEPHONE ENCOUNTER
Reached out to Gurpreet with [facility] in regards to rescheduling pt west     Was informed that pt is not currently insured and would need to cancel west all together     Pt is being referred to [facility]

## 2019-06-27 NOTE — TELEPHONE ENCOUNTER
----- Message from Leah Mariama sent at 6/27/2019 10:13 AM CDT -----  Contact: Bill Vázquez [facility], [phone] ext [phone]  Called in requesting to reschedule 7/8 appointment scheduled. Requests a call to patient also. Please advise.

## 2019-10-08 PROBLEM — R00.2 PALPITATIONS: Status: ACTIVE | Noted: 2019-10-08

## 2019-10-08 PROBLEM — Z82.49 FAMILY HISTORY OF CARDIOMYOPATHY: Status: ACTIVE | Noted: 2019-10-08

## 2019-10-08 PROBLEM — R07.89 CHEST PAIN, ATYPICAL: Status: ACTIVE | Noted: 2019-10-08

## 2019-10-08 PROBLEM — R06.00 DYSPNEA: Status: ACTIVE | Noted: 2019-10-08

## 2019-10-08 PROBLEM — I10 ESSENTIAL HYPERTENSION: Status: ACTIVE | Noted: 2019-10-08

## 2019-11-16 ENCOUNTER — HOSPITAL ENCOUNTER (EMERGENCY)
Facility: HOSPITAL | Age: 49
Discharge: HOME | End: 2019-11-16
Attending: EMERGENCY MEDICINE
Payer: MEDICAID

## 2019-11-16 VITALS
RESPIRATION RATE: 18 BRPM | BODY MASS INDEX: 36.44 KG/M2 | WEIGHT: 198 LBS | DIASTOLIC BLOOD PRESSURE: 70 MMHG | HEART RATE: 74 BPM | OXYGEN SATURATION: 100 % | SYSTOLIC BLOOD PRESSURE: 141 MMHG | HEIGHT: 62 IN | TEMPERATURE: 98 F

## 2019-11-16 DIAGNOSIS — L73.9 FOLLICULITIS: Primary | ICD-10-CM

## 2019-11-16 PROCEDURE — 99284 EMERGENCY DEPT VISIT MOD MDM: CPT | Mod: ER

## 2019-11-16 RX ORDER — CEPHALEXIN 250 MG/1
250 CAPSULE ORAL 4 TIMES DAILY
Qty: 28 CAPSULE | Refills: 0 | Status: SHIPPED | OUTPATIENT
Start: 2019-11-16 | End: 2019-11-23

## 2019-11-16 RX ORDER — MUPIROCIN 20 MG/G
OINTMENT TOPICAL 3 TIMES DAILY
Qty: 15 G | Refills: 0 | Status: SHIPPED | OUTPATIENT
Start: 2019-11-16

## 2019-11-16 NOTE — DISCHARGE INSTRUCTIONS
You are advised to follow up with your primary care provider for re-evaluation within 3 days.  You are instructed to return to the emergency department immediately for any new or worsening symptoms.

## 2019-11-16 NOTE — ED NOTES
Pt states no new medicines but Metformin 500mg tablet looks different this month than in past.  Medication looked up and verified on pill identifier and results shown to pt.

## 2019-11-17 NOTE — ED PROVIDER NOTES
Encounter Date: 2019       History     Chief Complaint   Patient presents with    Rash     Pt states has had rash x 3 days.  States + itching. States applied cortisone and blue star ointment to areas with no relief.      49-year-old female presents to the emergency department for evaluation of 3 day history of rash to his back.  She reports that the rash began gradually 3 days ago and has been constant and worsening since onset.  She reports that it started as a few small red bumps that were mildly itchy and have spread from her upper to her lower back.  She denies any fever, headache, dizziness, facial swelling, throat swelling, cough, shortness of breath, abdominal pain, nausea, vomiting, diarrhea or abdominal pain.  She denies any new soaps, lotions, detergents or medications.  She does report that a couple of months ago the  changed on her metformin, but this rash started a few months after beginning the new metformin.          Review of patient's allergies indicates:  No Known Allergies  Past Medical History:   Diagnosis Date    Anemia     Diabetes mellitus     Hypertension      Past Surgical History:   Procedure Laterality Date    ANKLE SURGERY Right      SECTION       Family History   Problem Relation Age of Onset    Breast cancer Mother     No Known Problems Father     Breast cancer Paternal Aunt      Social History     Tobacco Use    Smoking status: Never Smoker    Smokeless tobacco: Never Used   Substance Use Topics    Alcohol use: No    Drug use: No     Review of Systems   Constitutional: Negative for activity change, appetite change and fever.   HENT: Negative for congestion, ear discharge, ear pain, facial swelling, rhinorrhea, sore throat, trouble swallowing and voice change.    Eyes: Negative for visual disturbance.   Respiratory: Negative for cough and shortness of breath.    Cardiovascular: Negative for chest pain.   Gastrointestinal: Negative for abdominal  pain, constipation, diarrhea, nausea and vomiting.   Genitourinary: Negative for decreased urine volume and dysuria.   Musculoskeletal: Negative for back pain, joint swelling and neck pain.   Skin: Positive for rash.   Neurological: Negative for dizziness, syncope and headaches.       Physical Exam     Initial Vitals [11/16/19 1038]   BP Pulse Resp Temp SpO2   (!) 188/79 74 18 98 °F (36.7 °C) 100 %      MAP       --         Physical Exam    Nursing note and vitals reviewed.  Constitutional: She appears well-developed and well-nourished. She is not diaphoretic. No distress.   HENT:   Head: Normocephalic and atraumatic.   Right Ear: External ear normal.   Left Ear: External ear normal.   Nose: Nose normal.   Mouth/Throat: Oropharynx is clear and moist.   Eyes: Conjunctivae and EOM are normal. Pupils are equal, round, and reactive to light.   Neck: Normal range of motion. Neck supple.   Cardiovascular: Normal rate, regular rhythm and normal heart sounds.   Pulmonary/Chest: Breath sounds normal. No respiratory distress. She has no wheezes. She has no rhonchi. She has no rales. She exhibits no tenderness.   Lymphadenopathy:     She has no cervical adenopathy.   Neurological: She is alert and oriented to person, place, and time.   Skin: Skin is warm and dry. Rash noted. Rash is pustular.        Psychiatric: She has a normal mood and affect.         ED Course   Procedures  Labs Reviewed - No data to display       Imaging Results    None          Medical Decision Making:   Initial Assessment:   49-year-old female presents for evaluation of rash. Physical exam reveals a nontoxic-appearing female in no acute distress. Patient is afebrile and vital signs within normal limits.  Neurological exam reveals an alert and oriented patient.  No facial swelling or periorbital erythema/edema noted. Skin exam reveals Pustular rash noted over the upper and lower back surrounding the hair follicles.  No surrounding erythema, edema or  induration noted.  No ulcerations, vesicles or bulla noted.  Differential Diagnosis:   Folliculitis  No evidence of fungal etiology, Himanshu Dustin syndrome or anaphylaxis  ED Management:  Discussed these findings at length with the patient verbalizes understanding and agreement course of treatment.  Instructed patient to follow up with her primary care provider for re-evaluation and to return to the emergency department immediately for any new or worsening symptoms                                  Clinical Impression:       ICD-10-CM ICD-9-CM   1. Folliculitis L73.9 704.8                             Margoth Sanders PA-C  11/17/19 1109

## 2022-07-28 ENCOUNTER — HOSPITAL ENCOUNTER (EMERGENCY)
Facility: HOSPITAL | Age: 52
Discharge: HOME | End: 2022-07-28
Attending: EMERGENCY MEDICINE

## 2022-07-28 VITALS
SYSTOLIC BLOOD PRESSURE: 178 MMHG | BODY MASS INDEX: 31.67 KG/M2 | WEIGHT: 209 LBS | RESPIRATION RATE: 18 BRPM | TEMPERATURE: 98 F | HEIGHT: 68 IN | HEART RATE: 88 BPM | OXYGEN SATURATION: 100 % | DIASTOLIC BLOOD PRESSURE: 88 MMHG

## 2022-07-28 DIAGNOSIS — B37.31 YEAST VAGINITIS: Primary | ICD-10-CM

## 2022-07-28 LAB
BILIRUB UR QL STRIP: NEGATIVE
CLARITY UR REFRACT.AUTO: CLEAR
COLOR UR AUTO: YELLOW
GLUCOSE UR QL STRIP: NEGATIVE
HGB UR QL STRIP: NEGATIVE
KETONES UR QL STRIP: NEGATIVE
LEUKOCYTE ESTERASE UR QL STRIP: NEGATIVE
NITRITE UR QL STRIP: NEGATIVE
PH UR STRIP: 7 [PH] (ref 5–8)
PROT UR QL STRIP: NEGATIVE
SP GR UR STRIP: 1.01 (ref 1–1.03)
URN SPEC COLLECT METH UR: NORMAL
UROBILINOGEN UR STRIP-ACNC: NEGATIVE EU/DL

## 2022-07-28 PROCEDURE — 81003 URINALYSIS AUTO W/O SCOPE: CPT | Mod: ER | Performed by: EMERGENCY MEDICINE

## 2022-07-28 PROCEDURE — 25000003 PHARM REV CODE 250: Mod: ER | Performed by: EMERGENCY MEDICINE

## 2022-07-28 PROCEDURE — 99283 EMERGENCY DEPT VISIT LOW MDM: CPT | Mod: 25,ER

## 2022-07-28 RX ORDER — FLUCONAZOLE 150 MG/1
150 TABLET ORAL
Status: COMPLETED | OUTPATIENT
Start: 2022-07-28 | End: 2022-07-28

## 2022-07-28 RX ADMIN — FLUCONAZOLE 150 MG: 150 TABLET ORAL at 10:07

## 2022-07-29 NOTE — ED PROVIDER NOTES
Encounter Date: 2022       History     Chief Complaint   Patient presents with    Female  Problem     Vag pain and dysuria. Denies d/c     Patient currently presents with concerns regarding vaginal irritation. Onset noted over the past few days.  Patient denies vaginal discharge.  Skin changes are noted with redness and itching noted.  Abdominal pain is not described.  Patient denies fever.  There has been dysuria.          Review of patient's allergies indicates:  No Known Allergies  Past Medical History:   Diagnosis Date    Anemia     Diabetes mellitus     Hypertension      Past Surgical History:   Procedure Laterality Date    ANKLE SURGERY Right      SECTION       Family History   Problem Relation Age of Onset    Breast cancer Mother     No Known Problems Father     Breast cancer Paternal Aunt      Social History     Tobacco Use    Smoking status: Never Smoker    Smokeless tobacco: Never Used   Substance Use Topics    Alcohol use: No    Drug use: No     Review of Systems   Constitutional: Negative for chills and fever.   HENT: Negative for congestion and rhinorrhea.    Respiratory: Negative for cough and shortness of breath.    Cardiovascular: Negative for chest pain and palpitations.   Gastrointestinal: Negative for abdominal pain, diarrhea and vomiting.   Genitourinary: Positive for dysuria. Negative for vaginal bleeding and vaginal discharge.   Skin: Negative for color change and rash.   Allergic/Immunologic: Negative for immunocompromised state.   Neurological: Negative for dizziness and light-headedness.   Hematological: Negative for adenopathy. Does not bruise/bleed easily.     Physical Exam     Initial Vitals   BP Pulse Resp Temp SpO2   22   (!) 187/91 90 18 98 °F (36.7 °C) 98 %      MAP       --                Vitals:    22 2255   BP: (!) 187/91  (!) 178/88   Pulse: 90  88  "  Resp: 18  18   Temp:  98 °F (36.7 °C)    SpO2: 98%  100%   Weight: 94.8 kg (209 lb)     Height:   5' 8" (1.727 m)       Physical Exam    Nursing note and vitals reviewed.  Constitutional: She appears well-developed and well-nourished. She is not diaphoretic. No distress.   HENT:   Head: Normocephalic and atraumatic.   Cardiovascular: Normal rate, regular rhythm, normal heart sounds and intact distal pulses.   Pulmonary/Chest: Breath sounds normal. No respiratory distress.   Genitourinary:    Pelvic exam was performed with patient supine.      No vaginal discharge or bleeding.   No bleeding in the vagina.    Genitourinary Comments: Exam chaperoned by ASHUTOSH Lawrence RN           Neurological: She is alert and oriented to person, place, and time.   Skin: Skin is warm and dry.       ED Course   Procedures  Labs Reviewed   URINALYSIS, REFLEX TO URINE CULTURE    Narrative:     Preferred Collection Type->Urine, Clean Catch  Specimen Source->Urine  Collection Type->Urine, Clean Catch          Imaging Results    None          Medications   fluconazole tablet 150 mg (150 mg Oral Given 7/28/22 2253)     Medical Decision Making:   ED Management:  All findings were reviewed with the patient/family in detail.  I see no indication of an emergent process beyond that addressed during our encounter but have duly counseled the patient/family regarding the need for prompt follow-up as well as the indications that should prompt immediate return to the emergency room should new or worrisome developments occur.  The patient has additionally been provided with printed information regarding diagnosis as well as instructions regarding follow up and any medications intended to manage the patient's aforementioned conditions.  The patient/family communicates understanding of all this information and all remaining questions and concerns were addressed at this time.                            Clinical Impression:   Final diagnoses:  [B37.3] Yeast " vaginitis (Primary)          ED Disposition Condition    Discharge Stable        ED Prescriptions     None        Follow-up Information     Follow up With Specialties Details Why Contact Info    [facility] Schedule an appointment as soon as possible for a visit  for reassessment [address] [place] [address] [id number] [phone]      [facility] - Emergency Dept Emergency Medicine Go to  As needed, If symptoms worsen [address] [facility] [id number] [phone]           Husam Osman MD  07/29/22 0302

## 2022-12-22 ENCOUNTER — HOSPITAL ENCOUNTER (EMERGENCY)
Facility: HOSPITAL | Age: 52
Discharge: HOME | End: 2022-12-22
Attending: EMERGENCY MEDICINE
Payer: MEDICAID

## 2022-12-22 VITALS
RESPIRATION RATE: 18 BRPM | OXYGEN SATURATION: 10 % | TEMPERATURE: 98 F | SYSTOLIC BLOOD PRESSURE: 163 MMHG | DIASTOLIC BLOOD PRESSURE: 89 MMHG | HEART RATE: 88 BPM | BODY MASS INDEX: 37.17 KG/M2 | WEIGHT: 202 LBS | HEIGHT: 62 IN
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
## 2022-12-22 DIAGNOSIS — S82.832A CLOSED FRACTURE OF DISTAL END OF LEFT FIBULA, UNSPECIFIED FRACTURE MORPHOLOGY, INITIAL ENCOUNTER: Primary | ICD-10-CM

## 2022-12-22 PROCEDURE — 29515 APPLICATION SHORT LEG SPLINT: CPT | Mod: LT,ER

## 2022-12-22 PROCEDURE — 99283 EMERGENCY DEPT VISIT LOW MDM: CPT | Mod: 25,ER

## 2022-12-22 RX ORDER — HYDROCODONE BITARTRATE AND ACETAMINOPHEN 5; 325 MG/1; MG/1
1 TABLET ORAL EVERY 4 HOURS PRN
Qty: 12 TABLET | Refills: 0 | Status: SHIPPED | OUTPATIENT
Start: 2022-12-22 | End: 2022-12-25

## 2022-12-22 NOTE — ED PROVIDER NOTES
Encounter Date: 2022       History     Chief Complaint   Patient presents with    Ankle Pain     Left ankle pain and swelling, twisted this morning, no meds      52-year-old female presents with left ankle pain and swelling after twisting it this morning while working in garden.  Patient says that the pain was mild and then she felt numbness.  No other injuries.    Review of patient's allergies indicates:  No Known Allergies  Past Medical History:   Diagnosis Date    Anemia     Diabetes mellitus     Hypertension      Past Surgical History:   Procedure Laterality Date    ANKLE SURGERY Right      SECTION       Family History   Problem Relation Age of Onset    Breast cancer Mother     No Known Problems Father     Breast cancer Paternal Aunt      Social History     Tobacco Use    Smoking status: Never    Smokeless tobacco: Never   Substance Use Topics    Alcohol use: No    Drug use: No     Review of Systems   Constitutional:  Negative for fever.   Respiratory:  Negative for shortness of breath.    Cardiovascular:  Negative for chest pain.   Gastrointestinal:  Negative for vomiting.   Musculoskeletal:  Positive for arthralgias and joint swelling. Negative for back pain and neck pain.        Left ankle pain and swelling   Skin:  Negative for rash.   Neurological:  Negative for headaches.     Physical Exam     Initial Vitals [22 1227]   BP Pulse Resp Temp SpO2   (!) 163/89 88 18 98.2 °F (36.8 °C) (!) 10 %      MAP       --         Physical Exam    Nursing note and vitals reviewed.  HENT:   Head: Atraumatic.   Eyes: EOM are normal.   Neck:   Normal range of motion.  Cardiovascular:      Exam reveals no gallop and no friction rub.       No murmur heard.  Pulmonary/Chest: No respiratory distress.   Musculoskeletal:      Cervical back: Normal range of motion.      Comments: Moderate swelling to the left lateral ankle.  Tenderness to palpation to the left lateral ankle with mild tenderness to the medial  ankle.  Neurovascularly intact     Neurological: She is alert and oriented to person, place, and time.   Psychiatric: She has a normal mood and affect.       ED Course   Orthopedic Injury    Date/Time: 12/22/2022 1:04 PM  Performed by: Marito Andrews MD  Authorized by: Marito Andrews MD     Location procedure was performed:  [facility] EMERGENCY DEPARTMENT  Injury:     Injury location:  Ankle    Location details:  Left ankle    Injury type:  Fracture    Fracture type: lateral malleolus      Fracture type: lateral malleolus        Pre-procedure assessment:     Neurovascular status: Neurovascularly intact      Distal perfusion: normal      Neurological function: normal      Range of motion: reduced        Selections made in this section will also lock the Injury type section above.:     Manipulation performed?: No      Immobilization:  Splint    Splint type:  Short leg    Supplies used:  Ortho-Glass  Post-procedure assessment:     Neurovascular status: Neurovascularly intact      Distal perfusion: normal      Neurological function: normal      Range of motion: splinted      Patient tolerance:  Patient tolerated the procedure well with no immediate complications  Labs Reviewed - No data to display       Imaging Results              X-Ray Ankle Complete Left (Final result)  Result time 12/22/22 12:58:04      Final result by Rudolph Garcia MD (12/22/22 12:58:04)                   Impression:      Distal fibular fracture.      Electronically signed by: Rudolph Garcia MD  Date:    12/22/2022  Time:    12:58               Narrative:    EXAMINATION:  XR ANKLE COMPLETE 3 VIEW LEFT    CLINICAL HISTORY:  - Unspecified fall, initial encounter.  Left ankle pain    COMPARISON:  None    FINDINGS:  Acute oblique fracture of the distal fibular diaphysis above the tibiotalar joint with adjacent soft tissue swelling.  Joint space and alignment is within normal limits.  Mild midfoot DJD.  Calcaneal spur.                                        Medications - No data to display  Medical Decision Making:   Initial Assessment:   52-year-old female status post fall complaining of left ankle pain and swelling.  Pulses intact.  Patient can wiggle toes.  Neuro intact.  X-ray reviewed interpreted myself shows distal fibula fracture without significant displacement.  Splint applied.  Patient will be referred to Podiatry for further management.                        Clinical Impression:   Final diagnoses:  [W19.XXXA] Fall  [S82.762A] Closed fracture of distal end of left fibula, unspecified fracture morphology, initial encounter (Primary)        ED Disposition Condition    Discharge Stable          ED Prescriptions       Medication Sig Dispense Start Date End Date Auth. Provider    HYDROcodone-acetaminophen (NORCO) 5-325 mg per tablet Take 1 tablet by mouth every 4 (four) hours as needed for Pain. 12 tablet 12/22/2022 12/25/2022 Marito Andrews MD          Follow-up Information       Follow up With Specialties Details Why Contact Info    Podiatry  Schedule an appointment as soon as possible for a visit                Marito Andrews MD  12/22/22 5024

## 2022-12-22 NOTE — ED NOTES
Crutch training provided.  Pt able to provide proper return demo.  Pt noted ambulating with crutches without difficulty. Pt provided splint/circulation education, pt verbalized understanding. Splint remains in place to LLE, cap refill < 3 sec, pt denies numbness or tingling. Pt reports no further questions or concerns at this time.

## 2024-03-12 ENCOUNTER — HOSPITAL ENCOUNTER (EMERGENCY)
Facility: HOSPITAL | Age: 54
Discharge: HOME | End: 2024-03-12
Attending: STUDENT IN AN ORGANIZED HEALTH CARE EDUCATION/TRAINING PROGRAM

## 2024-03-12 VITALS
HEIGHT: 62 IN | BODY MASS INDEX: 28.52 KG/M2 | WEIGHT: 155 LBS | SYSTOLIC BLOOD PRESSURE: 142 MMHG | HEART RATE: 100 BPM | OXYGEN SATURATION: 97 % | RESPIRATION RATE: 18 BRPM | TEMPERATURE: 99 F | DIASTOLIC BLOOD PRESSURE: 78 MMHG

## 2024-03-12 DIAGNOSIS — J02.0 STREP PHARYNGITIS: Primary | ICD-10-CM

## 2024-03-12 LAB
GROUP A STREP, MOLECULAR: POSITIVE
INFLUENZA A, MOLECULAR: NEGATIVE
INFLUENZA B, MOLECULAR: NEGATIVE
SARS-COV-2 RDRP RESP QL NAA+PROBE: NEGATIVE
SPECIMEN SOURCE: NORMAL

## 2024-03-12 PROCEDURE — 87502 INFLUENZA DNA AMP PROBE: CPT | Mod: ER | Performed by: STUDENT IN AN ORGANIZED HEALTH CARE EDUCATION/TRAINING PROGRAM

## 2024-03-12 PROCEDURE — 87651 STREP A DNA AMP PROBE: CPT | Mod: ER | Performed by: STUDENT IN AN ORGANIZED HEALTH CARE EDUCATION/TRAINING PROGRAM

## 2024-03-12 PROCEDURE — 99283 EMERGENCY DEPT VISIT LOW MDM: CPT | Mod: ER

## 2024-03-12 PROCEDURE — U0002 COVID-19 LAB TEST NON-CDC: HCPCS | Mod: ER | Performed by: STUDENT IN AN ORGANIZED HEALTH CARE EDUCATION/TRAINING PROGRAM

## 2024-03-12 RX ORDER — AMOXICILLIN 500 MG/1
1000 CAPSULE ORAL DAILY
Qty: 20 CAPSULE | Refills: 0 | Status: SHIPPED | OUTPATIENT
Start: 2024-03-12 | End: 2024-03-22

## 2024-03-12 NOTE — ED PROVIDER NOTES
Encounter Date: 3/12/2024       History     Chief Complaint   Patient presents with    Sore Throat     Sore throat and body aches, weakness and chills that started yesterday     53-year-old female presents emergency room with reports of body aches and sore throat that began approximately yesterday.  Patient denies nausea, vomiting or diarrhea.  She denies taking medication for symptom control.  Patient denies fever, rash or neck stiffness.  She reports pain with swallowing.  No hoarseness nor facial swelling noted.  Patient has a past medical history of anemia, diabetes, hypertension.  See physical examination.    The history is provided by the patient. No  was used.     Review of patient's allergies indicates:  No Known Allergies  Past Medical History:   Diagnosis Date    Anemia     Diabetes mellitus     Hypertension      Past Surgical History:   Procedure Laterality Date    ANKLE SURGERY Right      SECTION       Family History   Problem Relation Age of Onset    Breast cancer Mother     No Known Problems Father     Breast cancer Paternal Aunt      Social History     Tobacco Use    Smoking status: Never    Smokeless tobacco: Never   Substance Use Topics    Alcohol use: No    Drug use: No     Review of Systems   Constitutional:  Positive for chills and fever.   HENT:  Positive for sore throat.    Musculoskeletal:  Positive for myalgias.   All other systems reviewed and are negative.      Physical Exam     Initial Vitals [24 0806]   BP Pulse Resp Temp SpO2   (!) 142/78 100 18 98.6 °F (37 °C) 97 %      MAP       --         Physical Exam    Constitutional: She appears well-developed and well-nourished.   HENT:   Head: Normocephalic.   Right Ear: Hearing and tympanic membrane normal.   Left Ear: Hearing and tympanic membrane normal.   Nose: Nose normal.   Mouth/Throat: No oral lesions. No trismus in the jaw. No uvula swelling. Posterior oropharyngeal erythema present. No oropharyngeal  exudate.   No sublingual swelling noted.  No angioedema.  No uvula shift or trismus.  No exudate present.   Eyes: Lids are normal. Pupils are equal, round, and reactive to light.   Neck:   Normal range of motion.  Cardiovascular:  Normal rate.           Pulmonary/Chest: Breath sounds normal. No respiratory distress. She has no wheezes. She has no rhonchi.   Abdominal: Abdomen is soft. There is no abdominal tenderness.   Musculoskeletal:         General: Normal range of motion.      Cervical back: Normal range of motion. No edema, erythema or rigidity. No spinous process tenderness or muscular tenderness. Normal range of motion.     Neurological: She is alert and oriented to person, place, and time.   Skin: Skin is warm and dry. No rash noted.   Psychiatric: She has a normal mood and affect. Her behavior is normal. Judgment and thought content normal.         ED Course   Procedures  Labs Reviewed   GROUP A STREP, MOLECULAR - Abnormal; Notable for the following components:       Result Value    Group A Strep, Molecular Positive (*)     All other components within normal limits   INFLUENZA A & B BY MOLECULAR   SARS-COV-2 RNA AMPLIFICATION, QUAL    Narrative:     Is the patient symptomatic?->Yes          Imaging Results    None          Medications - No data to display  Medical Decision Making  Differential Diagnosis includes, but is not limited to:  Js's angina, epiglottitis, foreign body aspiration, retropharyngeal abscess, peritonsillar abscess, esophageal perforation, mediastinitis, esophagitis, sialolithiasis, parotitis, laryngitis, tracheitis, dental/periapical abscess, TMJ disorder, pneumonia, Streptococcal/bacterial pharyngitis, viral pharyngitis.     Amount and/or Complexity of Data Reviewed  Labs:  Decision-making details documented in ED Course.    Risk  OTC drugs.  Prescription drug management.               ED Course as of 03/12/24 0833   Tue Mar 12, 2024   0831 Group A Strep, Molecular(!) [DT]   0830  COVID-19 Rapid Screening [DT]   0830 Patient notified of the need for follow up care with the primary care provider in addition to the medications prescribed.  She will be placed on amoxicillin in addition to rotation of Tylenol and Motrin as needed for body aches and/or fever.  She is to increase her fluid intake.  Strict return precautions have been given. [DT]      ED Course User Index  [DT] Lucie Deleon NP                           Clinical Impression:  Final diagnoses:  [J02.0] Strep pharyngitis (Primary)          ED Disposition Condition    Discharge Stable          ED Prescriptions       Medication Sig Dispense Start Date End Date Auth. Provider    amoxicillin (AMOXIL) 500 MG capsule Take 2 capsules (1,000 mg total) by mouth once daily. for 10 days 20 capsule 3/12/2024 3/22/2024 Lucie Deleon NP          Follow-up Information       Follow up With Specialties Details Why Contact [facility] Su, [facility] Schedule an appointment as soon as possible for a visit in 2 days  [address] [place] [id number]  [phone]               Lucie Deleon NP  03/12/24 9067

## 2024-03-12 NOTE — Clinical Note
"Dixie Ballmari Schultz was seen and treated in our emergency department on 3/12/2024.  She may return to work on 03/14/2024.       If you have any questions or concerns, please don't hesitate to call.      Lucie Deleon, JAYLEEN"

## 2024-03-12 NOTE — DISCHARGE INSTRUCTIONS

## 2024-04-05 ENCOUNTER — HOSPITAL ENCOUNTER (EMERGENCY)
Facility: HOSPITAL | Age: 54
Discharge: HOME | End: 2024-04-05
Attending: EMERGENCY MEDICINE

## 2024-04-05 VITALS
SYSTOLIC BLOOD PRESSURE: 123 MMHG | HEIGHT: 62 IN | TEMPERATURE: 98 F | OXYGEN SATURATION: 100 % | BODY MASS INDEX: 27.42 KG/M2 | WEIGHT: 149 LBS | DIASTOLIC BLOOD PRESSURE: 81 MMHG | RESPIRATION RATE: 20 BRPM | HEART RATE: 87 BPM

## 2024-04-05 DIAGNOSIS — M67.40 GANGLION CYST: ICD-10-CM

## 2024-04-05 DIAGNOSIS — R21 RASH: Primary | ICD-10-CM

## 2024-04-05 PROCEDURE — 25000003 PHARM REV CODE 250: Mod: ER

## 2024-04-05 PROCEDURE — 96372 THER/PROPH/DIAG INJ SC/IM: CPT

## 2024-04-05 PROCEDURE — 63600175 PHARM REV CODE 636 W HCPCS: Mod: ER

## 2024-04-05 PROCEDURE — 99285 EMERGENCY DEPT VISIT HI MDM: CPT | Mod: 25,ER

## 2024-04-05 RX ORDER — FAMOTIDINE 20 MG/1
20 TABLET, FILM COATED ORAL
Status: COMPLETED | OUTPATIENT
Start: 2024-04-05 | End: 2024-04-05

## 2024-04-05 RX ORDER — FAMOTIDINE 20 MG/1
20 TABLET, FILM COATED ORAL 2 TIMES DAILY
Qty: 60 TABLET | Refills: 0 | Status: SHIPPED | OUTPATIENT
Start: 2024-04-05 | End: 2024-05-05

## 2024-04-05 RX ORDER — CETIRIZINE HYDROCHLORIDE 10 MG/1
10 TABLET ORAL DAILY
Qty: 30 TABLET | Refills: 0 | Status: SHIPPED | OUTPATIENT
Start: 2024-04-05 | End: 2024-05-05

## 2024-04-05 RX ORDER — CETIRIZINE HYDROCHLORIDE 10 MG/1
10 TABLET ORAL
Status: COMPLETED | OUTPATIENT
Start: 2024-04-05 | End: 2024-04-05

## 2024-04-05 RX ORDER — DEXAMETHASONE SODIUM PHOSPHATE 4 MG/ML
10 INJECTION, SOLUTION INTRA-ARTICULAR; INTRALESIONAL; INTRAMUSCULAR; INTRAVENOUS; SOFT TISSUE
Status: COMPLETED | OUTPATIENT
Start: 2024-04-05 | End: 2024-04-05

## 2024-04-05 RX ADMIN — CETIRIZINE HYDROCHLORIDE 10 MG: 10 TABLET, FILM COATED ORAL at 09:04

## 2024-04-05 RX ADMIN — FAMOTIDINE 20 MG: 20 TABLET ORAL at 09:04

## 2024-04-05 RX ADMIN — DEXAMETHASONE SODIUM PHOSPHATE 10 MG: 4 INJECTION, SOLUTION INTRA-ARTICULAR; INTRALESIONAL; INTRAMUSCULAR; INTRAVENOUS; SOFT TISSUE at 09:04

## 2024-04-05 NOTE — ED NOTES
PT presents to the ED with C/O rash that started on right eye 3 days ago and spread to face, neck, and chest. +itching. NKA. NAD. O2 sats 100 on room air. Reports no relief with OTC meds.

## 2024-04-05 NOTE — ED PROVIDER NOTES
"Encounter Date: 2024       History     Chief Complaint   Patient presents with    Rash     Rash and itching to face for the past 3 days.     Joint Swelling     Patient reports a "knot" on her wrist that is not painful.      Dixie Schultz is a 53 y.o. female  has a past medical history of Anemia, Diabetes mellitus, and Hypertension. presenting to the Emergency Department for rash x3 days and "knot" on wrist x few months.  Reports the rash started on her right eyebrow and spread to the rest of her face, forearms, right side of her neck.  The rash is itchy.  Not painful.  No new soaps, detergents, lotions.  Patient reports her mounjaro prescription dose has been increased for the last 2 weeks.  No other medication changes.  No dietary changes.  Patient states she does eat due to the medication.  Patient states she has had this rash on her face before.  She has been using topical Benadryl, rubbing alcohol to manage her symptoms.  Patient reports a knot on the left wrist that has been there for few months.  Patient is seen by her primary care doctor for the bump and was told it was a cyst.  Patient states the bump has actually decreased in size.  No pain associated with it.  No discharge.  No warmth, redness.  No fevers or chills.  No shortness of breath or difficulty swallowing.  No tongue swelling.  No trismus, difficulty managing secretions.  No abdominal pain, nausea, vomiting, diarrhea.        The history is provided by the patient.     Review of patient's allergies indicates:  No Known Allergies  Past Medical History:   Diagnosis Date    Anemia     Diabetes mellitus     Hypertension      Past Surgical History:   Procedure Laterality Date    ANKLE SURGERY Right      SECTION       Family History   Problem Relation Age of Onset    Breast cancer Mother     No Known Problems Father     Breast cancer Paternal Aunt      Social History     Tobacco Use    Smoking status: Never    Smokeless tobacco: Never "   Substance Use Topics    Alcohol use: No    Drug use: No     Review of Systems   Constitutional:  Negative for chills and fever.   HENT:  Negative for drooling, facial swelling, sore throat and trouble swallowing.    Respiratory:  Negative for shortness of breath.    Cardiovascular:  Negative for chest pain.   Gastrointestinal:  Negative for abdominal pain, diarrhea, nausea and vomiting.   Genitourinary:  Negative for dysuria.   Musculoskeletal:  Negative for back pain.   Skin:  Positive for rash.   Neurological:  Negative for weakness.   Hematological:  Does not bruise/bleed easily.   All other systems reviewed and are negative.      Physical Exam     Initial Vitals [04/05/24 0924]   BP Pulse Resp Temp SpO2   123/81 87 20 97.8 °F (36.6 °C) 100 %      MAP       --         Physical Exam    Nursing note and vitals reviewed.  Constitutional: She appears well-developed and well-nourished. She is not diaphoretic. No distress.   HENT:   Head: Normocephalic.   Right Ear: Hearing, tympanic membrane and external ear normal.   Left Ear: Hearing, tympanic membrane and external ear normal.   Nose: Nose normal.   Mouth/Throat: Oropharynx is clear and moist.   Eyes: Conjunctivae, EOM and lids are normal. Pupils are equal, round, and reactive to light.   Neck: Neck supple.   Normal range of motion.  Cardiovascular:  Normal rate, regular rhythm and normal heart sounds.           Pulmonary/Chest: Breath sounds normal. No respiratory distress. She has no wheezes. She has no rhonchi.   Abdominal: Abdomen is soft. Bowel sounds are normal. There is no abdominal tenderness. There is no rebound and no guarding.   Musculoskeletal:         General: Normal range of motion.      Cervical back: Normal range of motion and neck supple. No rigidity.      Comments: Dorsolateral aspect of left wrist reveals a 2 cm nodule that is nontender to palpation.  No fluctuance.  Mass is soft and mobile.  No erythema, warmth, discharge.     Lymphadenopathy:      She has no cervical adenopathy.   Neurological: She is alert and oriented to person, place, and time. She has normal strength. GCS score is 15. GCS eye subscore is 4. GCS verbal subscore is 5. GCS motor subscore is 6.   Skin: Skin is warm and dry. Capillary refill takes less than 2 seconds. No rash noted.   Maculopapular erythematous rash distributed over the patient's face with isolated areas on the right shoulder, chest, and forearms.  Rash is blanchable.  Rash is pruritic.    Psychiatric: She has a normal mood and affect. Her behavior is normal. Judgment and thought content normal.         ED Course   ED US LTD Soft Tissue/Skin    Date/Time: 4/5/2024 11:10 AM    Performed by: Rosaline Sánchez PA-C  Authorized by: Keaton Grace MD    Procedure:  Focused Soft Tissue Ultrasound Exam  Charge?:  No (educational)    Labs Reviewed - No data to display       Imaging Results    None          Medications   cetirizine tablet 10 mg (10 mg Oral Given 4/5/24 0959)   famotidine tablet 20 mg (20 mg Oral Given 4/5/24 0959)   dexAMETHasone injection 10 mg (10 mg Intramuscular Given 4/5/24 0959)     Medical Decision Making  This is an emergent evaluation of 53 y.o. female in the ED presenting for rash and cyst. Physical exam reveals a non-toxic, afebrile, and well-appearing female in no apparent respiratory distress. Pertinent physical exam findings above. Vital signs stable. If available, previous records reviewed.    My overall impression is rash likely allergic in nature and ganglion cyst of left wrist. Differential Diagnoses: Including but not limited to Cellulitis, abscess, drug eruption, allergic/contact dermatitis, EM/SJS, viral exanthem, local trauma/contusion, shingles, cysts, abscess, mass    Discharge Meds/Instructions: zyrtec and pepcid. PCP f/u.     There does not appear to be any indication for further emergent testing, observation, or hospitalization at this time. A mutual shared decision making discussion was  had with the patient. Patient appears stable for and is comfortable with discharge home. The diagnosis, treatment plan, instructions for follow-up as well as ED return precautions were discussed. Advised to follow-up with PCP for outpatient follow-up in 2-3 days. Signs and symptoms that would warrant immediate return to ED were reviewed prior to discharge. All questions and concerns were asked, answered, and addressed. Patient expressed understanding and agreement with the plan.     Risk  OTC drugs.  Prescription drug management.               ED Course as of 04/05/24 1113   Fri Apr 05, 2024   1030 On re-evaluation, itching and rash has improved. [LH]      ED Course User Index  [LH] Rosaline Sánchez PA-C                             Clinical Impression:  Final diagnoses:  [R21] Rash (Primary)  [M67.40] Ganglion cyst          ED Disposition Condition    Discharge Stable          ED Prescriptions       Medication Sig Dispense Start Date End Date Auth. Provider    cetirizine (ZYRTEC) 10 MG tablet Take 1 tablet (10 mg total) by mouth once daily. 30 tablet 4/5/2024 5/5/2024 Rosaline Sánchez PA-C    famotidine (PEPCID) 20 MG tablet Take 1 tablet (20 mg total) by mouth 2 (two) times daily. 60 tablet 4/5/2024 5/5/2024 Rosaline Sánchez PA-C          Follow-up Information    None          Rosaline Sánchez PA-C  04/05/24 1113

## 2024-04-05 NOTE — DISCHARGE INSTRUCTIONS
Take Pepcid daily. Take Zyrtec daily. Return to ER if you develop any shortness of breath, difficulty breathing, throat swelling, tongue swelling, voice change, nausea, vomiting, or worsening rash.

## 2024-04-08 ENCOUNTER — NURSE TRIAGE (OUTPATIENT)
Dept: ADMINISTRATIVE | Facility: CLINIC | Age: 54
End: 2024-04-08

## 2024-04-08 NOTE — TELEPHONE ENCOUNTER
Caller has questions about the reason she was prescribed pepcid for rash. Caller told that pepcid is used for off label reason such as allergic reactions. Pt advised per protocol and verbalized understanding.   Reason for Disposition   Health Information question, no triage required and triager able to answer question    Additional Information   Negative: Requesting regular office appointment   Negative: [1] Caller requesting NON-URGENT health information AND [2] PCP's office is the best resource    Protocols used: Information Only Call - No Triage-A-

## 2024-08-14 ENCOUNTER — HOSPITAL ENCOUNTER (EMERGENCY)
Facility: HOSPITAL | Age: 54
Discharge: HOME | End: 2024-08-14
Attending: STUDENT IN AN ORGANIZED HEALTH CARE EDUCATION/TRAINING PROGRAM

## 2024-08-14 VITALS
HEIGHT: 62 IN | WEIGHT: 135 LBS | HEART RATE: 94 BPM | RESPIRATION RATE: 16 BRPM | TEMPERATURE: 98 F | BODY MASS INDEX: 24.84 KG/M2 | SYSTOLIC BLOOD PRESSURE: 161 MMHG | DIASTOLIC BLOOD PRESSURE: 81 MMHG | OXYGEN SATURATION: 100 %

## 2024-08-14 DIAGNOSIS — N83.201 CYST OF RIGHT OVARY: ICD-10-CM

## 2024-08-14 DIAGNOSIS — R10.84 GENERALIZED ABDOMINAL PAIN: Primary | ICD-10-CM

## 2024-08-14 DIAGNOSIS — R00.0 TACHYCARDIA: ICD-10-CM

## 2024-08-14 DIAGNOSIS — K80.20 CALCULUS OF GALLBLADDER WITHOUT CHOLECYSTITIS WITHOUT OBSTRUCTION: ICD-10-CM

## 2024-08-14 LAB
ALBUMIN SERPL BCP-MCNC: 4.1 G/DL (ref 3.5–5.2)
ALP SERPL-CCNC: 60 U/L (ref 38–126)
ALT SERPL W/O P-5'-P-CCNC: 14 U/L (ref 10–44)
ANION GAP SERPL CALC-SCNC: 13 MMOL/L (ref 8–16)
AST SERPL-CCNC: 26 U/L (ref 15–46)
B-HCG UR QL: NEGATIVE
BASOPHILS # BLD AUTO: 0.03 K/UL (ref 0–0.2)
BASOPHILS NFR BLD: 0.5 % (ref 0–1.9)
BILIRUB SERPL-MCNC: 1 MG/DL (ref 0.1–1)
BILIRUB UR QL STRIP: ABNORMAL
CALCIUM SERPL-MCNC: 9.2 MG/DL (ref 8.7–10.5)
CHLORIDE SERPL-SCNC: 96 MMOL/L (ref 95–110)
CLARITY UR REFRACT.AUTO: ABNORMAL
CO2 SERPL-SCNC: 30 MMOL/L (ref 23–29)
COLOR UR AUTO: YELLOW
CREAT SERPL-MCNC: 0.93 MG/DL (ref 0.5–1.4)
CTP QC/QA: YES
DIFFERENTIAL METHOD BLD: ABNORMAL
EOSINOPHIL # BLD AUTO: 0.1 K/UL (ref 0–0.5)
EOSINOPHIL NFR BLD: 1.1 % (ref 0–8)
ERYTHROCYTE [DISTWIDTH] IN BLOOD BY AUTOMATED COUNT: 12.4 % (ref 11.5–14.5)
EST. GFR  (NO RACE VARIABLE): >60 ML/MIN/1.73 M^2
GLUCOSE SERPL-MCNC: 83 MG/DL (ref 70–110)
GLUCOSE UR QL STRIP: NEGATIVE
HCT VFR BLD AUTO: 35.3 % (ref 37–48.5)
HGB BLD-MCNC: 12.1 G/DL (ref 12–16)
HGB UR QL STRIP: NEGATIVE
IMM GRANULOCYTES # BLD AUTO: 0.01 K/UL (ref 0–0.04)
IMM GRANULOCYTES NFR BLD AUTO: 0.2 % (ref 0–0.5)
INFLUENZA A, MOLECULAR: NEGATIVE
INFLUENZA B, MOLECULAR: NEGATIVE
KETONES UR QL STRIP: ABNORMAL
LEUKOCYTE ESTERASE UR QL STRIP: NEGATIVE
LIPASE SERPL-CCNC: 115 U/L (ref 23–300)
LYMPHOCYTES # BLD AUTO: 2 K/UL (ref 1–4.8)
LYMPHOCYTES NFR BLD: 36.8 % (ref 18–48)
MAGNESIUM SERPL-MCNC: 1.6 MG/DL (ref 1.6–2.6)
MCH RBC QN AUTO: 29.4 PG (ref 27–31)
MCHC RBC AUTO-ENTMCNC: 34.3 G/DL (ref 32–36)
MCV RBC AUTO: 86 FL (ref 82–98)
MONOCYTES # BLD AUTO: 0.5 K/UL (ref 0.3–1)
MONOCYTES NFR BLD: 8.9 % (ref 4–15)
NEUTROPHILS # BLD AUTO: 2.9 K/UL (ref 1.8–7.7)
NEUTROPHILS NFR BLD: 52.5 % (ref 38–73)
NITRITE UR QL STRIP: NEGATIVE
NRBC BLD-RTO: 0 /100 WBC
PH UR STRIP: 6 [PH] (ref 5–8)
PLATELET # BLD AUTO: 170 K/UL (ref 150–450)
PMV BLD AUTO: 12.2 FL (ref 9.2–12.9)
POTASSIUM SERPL-SCNC: 3.1 MMOL/L (ref 3.5–5.1)
PROT SERPL-MCNC: 7.3 G/DL (ref 6–8.4)
PROT UR QL STRIP: ABNORMAL
RBC # BLD AUTO: 4.11 M/UL (ref 4–5.4)
SARS-COV-2 RDRP RESP QL NAA+PROBE: NEGATIVE
SODIUM SERPL-SCNC: 139 MMOL/L (ref 136–145)
SP GR UR STRIP: 1.02 (ref 1–1.03)
SPECIMEN SOURCE: NORMAL
URN SPEC COLLECT METH UR: ABNORMAL
UROBILINOGEN UR STRIP-ACNC: NEGATIVE EU/DL
UUN UR-MCNC: 20 MG/DL (ref 7–17)
WBC # BLD AUTO: 5.49 K/UL (ref 3.9–12.7)

## 2024-08-14 PROCEDURE — 96375 TX/PRO/DX INJ NEW DRUG ADDON: CPT | Mod: ER

## 2024-08-14 PROCEDURE — 99285 EMERGENCY DEPT VISIT HI MDM: CPT | Mod: 25,ER

## 2024-08-14 PROCEDURE — 85025 COMPLETE CBC W/AUTO DIFF WBC: CPT | Mod: ER

## 2024-08-14 PROCEDURE — 93010 ELECTROCARDIOGRAM REPORT: CPT | Mod: ,,, | Performed by: STUDENT IN AN ORGANIZED HEALTH CARE EDUCATION/TRAINING PROGRAM

## 2024-08-14 PROCEDURE — 80053 COMPREHEN METABOLIC PANEL: CPT | Mod: ER

## 2024-08-14 PROCEDURE — 25000003 PHARM REV CODE 250: Mod: ER

## 2024-08-14 PROCEDURE — 63600175 PHARM REV CODE 636 W HCPCS: Mod: ER

## 2024-08-14 PROCEDURE — 83690 ASSAY OF LIPASE: CPT | Mod: ER

## 2024-08-14 PROCEDURE — U0002 COVID-19 LAB TEST NON-CDC: HCPCS | Mod: ER | Performed by: STUDENT IN AN ORGANIZED HEALTH CARE EDUCATION/TRAINING PROGRAM

## 2024-08-14 PROCEDURE — 87502 INFLUENZA DNA AMP PROBE: CPT | Mod: ER | Performed by: STUDENT IN AN ORGANIZED HEALTH CARE EDUCATION/TRAINING PROGRAM

## 2024-08-14 PROCEDURE — 93005 ELECTROCARDIOGRAM TRACING: CPT | Mod: ER

## 2024-08-14 PROCEDURE — 99900035 HC TECH TIME PER 15 MIN (STAT): Mod: ER

## 2024-08-14 PROCEDURE — 81003 URINALYSIS AUTO W/O SCOPE: CPT | Mod: ER

## 2024-08-14 PROCEDURE — 83735 ASSAY OF MAGNESIUM: CPT | Mod: ER

## 2024-08-14 PROCEDURE — 81025 URINE PREGNANCY TEST: CPT | Mod: ER

## 2024-08-14 PROCEDURE — 25500020 PHARM REV CODE 255: Mod: ER

## 2024-08-14 PROCEDURE — 96374 THER/PROPH/DIAG INJ IV PUSH: CPT | Mod: ER

## 2024-08-14 RX ORDER — ACETAMINOPHEN 500 MG
1000 TABLET ORAL 4 TIMES DAILY
Qty: 112 TABLET | Refills: 0 | Status: SHIPPED | OUTPATIENT
Start: 2024-08-14 | End: 2024-08-28

## 2024-08-14 RX ORDER — DICYCLOMINE HYDROCHLORIDE 20 MG/1
20 TABLET ORAL 2 TIMES DAILY
Qty: 20 TABLET | Refills: 0 | Status: SHIPPED | OUTPATIENT
Start: 2024-08-14 | End: 2024-08-24

## 2024-08-14 RX ORDER — IBUPROFEN 400 MG/1
800 TABLET ORAL
Status: COMPLETED | OUTPATIENT
Start: 2024-08-14 | End: 2024-08-14

## 2024-08-14 RX ORDER — DIPHENHYDRAMINE HYDROCHLORIDE 50 MG/ML
25 INJECTION INTRAMUSCULAR; INTRAVENOUS
Status: COMPLETED | OUTPATIENT
Start: 2024-08-14 | End: 2024-08-14

## 2024-08-14 RX ORDER — PROCHLORPERAZINE EDISYLATE 5 MG/ML
10 INJECTION INTRAMUSCULAR; INTRAVENOUS ONCE
Status: COMPLETED | OUTPATIENT
Start: 2024-08-14 | End: 2024-08-14

## 2024-08-14 RX ORDER — POTASSIUM CHLORIDE 20 MEQ/1
40 TABLET, EXTENDED RELEASE ORAL
Status: COMPLETED | OUTPATIENT
Start: 2024-08-14 | End: 2024-08-14

## 2024-08-14 RX ORDER — SODIUM CHLORIDE 9 MG/ML
1000 INJECTION, SOLUTION INTRAVENOUS
Status: COMPLETED | OUTPATIENT
Start: 2024-08-14 | End: 2024-08-14

## 2024-08-14 RX ORDER — IBUPROFEN 800 MG/1
800 TABLET ORAL EVERY 6 HOURS PRN
Qty: 20 TABLET | Refills: 0 | Status: SHIPPED | OUTPATIENT
Start: 2024-08-14

## 2024-08-14 RX ADMIN — PROCHLORPERAZINE EDISYLATE 10 MG: 5 INJECTION INTRAMUSCULAR; INTRAVENOUS at 06:08

## 2024-08-14 RX ADMIN — DIPHENHYDRAMINE HYDROCHLORIDE 25 MG: 50 INJECTION INTRAMUSCULAR; INTRAVENOUS at 06:08

## 2024-08-14 RX ADMIN — IOHEXOL 80 ML: 350 INJECTION, SOLUTION INTRAVENOUS at 07:08

## 2024-08-14 RX ADMIN — POTASSIUM CHLORIDE 40 MEQ: 1500 TABLET, EXTENDED RELEASE ORAL at 06:08

## 2024-08-14 RX ADMIN — IBUPROFEN 800 MG: 400 TABLET ORAL at 05:08

## 2024-08-14 RX ADMIN — SODIUM CHLORIDE 1000 ML: 9 INJECTION, SOLUTION INTRAVENOUS at 06:08

## 2024-08-14 NOTE — Clinical Note
"Dixie Balla" Antoine was seen and treated in our emergency department on 8/14/2024.  She may return to work on 08/17/2024.       If you have any questions or concerns, please don't hesitate to call.      Rosaline Sánchez PA-C"

## 2024-08-14 NOTE — ED PROVIDER NOTES
"Encounter Date: 2024       History     Chief Complaint   Patient presents with    Headache     Pt reports headache, weakness, and generalized pain that started today. No meds.      Patient is a 54-year-old female with a past medical history of anemia, diabetes mellitus, and hypertension who presents to emergency room for multiple complaints.  Patient states that yesterday she started to feel generalized weakness and increased fatigue.  Earlier this morning she started to have a headache to the entirety of her head.  It has been gradually worsening in severity.  It has not been thunderclap or most severe headache of her life.  Now she feels as if she is having associated blurry vision with headache.  She also endorses diffuse abdominal pain.  Can not describe headache or abdominal pain to me.  Denies nausea, vomiting, changes in bowel movements, fever, body aches, chills, chest pain, shortness of breath, numbness, tingling, leg swelling, dysuria, hematuria, or others at this time.  Patient is currently on IM weight loss/diabetic shots.  Unsure of name.  Also takes metformin for diabetes.  No recent increases in dosage.  Of note, spouse states that patient got multiple shots after getting a root canal yesterday.  She was told to rest, but has been "nonstop."    The history is provided by the patient. No  was used.     Review of patient's allergies indicates:  No Known Allergies  Past Medical History:   Diagnosis Date    Anemia     Diabetes mellitus     Hypertension      Past Surgical History:   Procedure Laterality Date    ANKLE SURGERY Right      SECTION       Family History   Problem Relation Name Age of Onset    Breast cancer Mother      No Known Problems Father      Breast cancer Paternal Aunt       Social History     Tobacco Use    Smoking status: Never    Smokeless tobacco: Never   Substance Use Topics    Alcohol use: No    Drug use: No     Review of Systems   Constitutional:  " Positive for fatigue. Negative for chills, diaphoresis and fever.   HENT:  Negative for congestion, sore throat and trouble swallowing.    Eyes:  Positive for photophobia and visual disturbance (blurry). Negative for pain and itching.   Respiratory:  Negative for cough and shortness of breath.    Cardiovascular:  Negative for chest pain and palpitations.   Gastrointestinal:  Positive for abdominal pain (diffuse). Negative for blood in stool, constipation, diarrhea, nausea and vomiting.   Genitourinary:  Negative for difficulty urinating, dysuria, frequency and urgency.   Musculoskeletal:  Negative for back pain and myalgias.   Skin:  Negative for rash and wound.   Neurological:  Positive for weakness (generalized) and headaches. Negative for light-headedness and numbness.       Physical Exam     Initial Vitals [08/14/24 1710]   BP Pulse Resp Temp SpO2   (!) 144/84 (!) 111 19 97.7 °F (36.5 °C) 100 %      MAP       --         Physical Exam    Nursing note and vitals reviewed.  Constitutional: She appears well-developed and well-nourished. She is not diaphoretic. She appears distressed (secondary to pain).   Patient tearful.  Lying in fetal position in the bed.  Maintaining airway appropriately.  Speaking in complete sentences.   HENT:   Head: Normocephalic and atraumatic.   Right Ear: External ear normal.   Left Ear: External ear normal.   Eyes: EOM are normal. Pupils are equal, round, and reactive to light.   Neck: Neck supple.   Normal range of motion.  Cardiovascular:  Regular rhythm and normal heart sounds.   Tachycardia present.   Exam reveals no friction rub.       No murmur heard.  Pulmonary/Chest: Breath sounds normal. No respiratory distress. She has no wheezes. She has no rhonchi. She has no rales.   Abdominal: Abdomen is soft. Bowel sounds are normal. She exhibits no distension. There is abdominal tenderness (diffuse). There is no rebound and no guarding.   Musculoskeletal:         General: No tenderness or  edema. Normal range of motion.      Cervical back: Normal range of motion and neck supple.     Neurological: She is alert and oriented to person, place, and time. She has normal strength. No cranial nerve deficit or sensory deficit. GCS score is 15. GCS eye subscore is 4. GCS verbal subscore is 5. GCS motor subscore is 6.   Patient able to ambulate with steady gait.  Strength 5/5 in bilateral lower and upper extremities.   strength 5/5 and equal.  Sensation intact throughout.  No cranial nerve deficits. No pronator drift. No tremor. Patient is speaking in complete sentences.  No slurred speech.   Skin: Skin is warm and dry.   Psychiatric: She has a normal mood and affect. Her behavior is normal. Thought content normal.         ED Course   Procedures  Labs Reviewed   CBC W/ AUTO DIFFERENTIAL - Abnormal       Result Value    WBC 5.49      RBC 4.11      Hemoglobin 12.1      Hematocrit 35.3 (*)     MCV 86      MCH 29.4      MCHC 34.3      RDW 12.4      Platelets 170      MPV 12.2      Immature Granulocytes 0.2      Gran # (ANC) 2.9      Immature Grans (Abs) 0.01      Lymph # 2.0      Mono # 0.5      Eos # 0.1      Baso # 0.03      nRBC 0      Gran % 52.5      Lymph % 36.8      Mono % 8.9      Eosinophil % 1.1      Basophil % 0.5      Differential Method Automated     COMPREHENSIVE METABOLIC PANEL - Abnormal    Sodium 139      Potassium 3.1 (*)     Chloride 96      CO2 30 (*)     Glucose 83      BUN 20 (*)     Creatinine 0.93      Calcium 9.2      Total Protein 7.3      Albumin 4.1      Total Bilirubin 1.0      Alkaline Phosphatase 60      AST 26      ALT 14      Anion Gap 13      eGFR >60.0     URINALYSIS, REFLEX TO URINE CULTURE - Abnormal    Specimen UA Urine, Clean Catch      Color, UA Yellow      Appearance, UA Hazy (*)     pH, UA 6.0      Specific Gravity, UA 1.025      Protein, UA Trace (*)     Glucose, UA Negative      Ketones, UA Trace (*)     Bilirubin (UA) 1+ (*)     Occult Blood UA Negative      Nitrite,  UA Negative      Urobilinogen, UA Negative      Leukocytes, UA Negative      Narrative:     Preferred Collection Type->Urine, Clean Catch  Specimen Source->Urine   INFLUENZA A & B BY MOLECULAR    Influenza A, Molecular Negative      Influenza B, Molecular Negative      Flu A & B Source Nasal swab     SARS-COV-2 RNA AMPLIFICATION, QUAL    SARS-CoV-2 RNA, Amplification, Qual Negative     MAGNESIUM    Magnesium 1.6     LIPASE    Lipase Result 115     POCT URINE PREGNANCY    POC Preg Test, Ur Negative       Acceptable Yes            Imaging Results              CT Abdomen Pelvis With IV Contrast NO Oral Contrast (Final result)  Result time 08/14/24 19:51:27      Final result by Travis Oneil MD (08/14/24 19:51:27)                   Impression:      No acute process    Complex right adnexal cystic structure    Gallstone    Fatty infiltration liver.    All CT scans at this facility use dose modulation, iterative reconstruction, and/or weight based dosing when appropriate to reduce radiation dose to as low as reasonably achievable.      Electronically signed by: Travis Oneil  Date:    08/14/2024  Time:    19:51               Narrative:    EXAMINATION:  CT ABDOMEN PELVIS WITH IV CONTRAST    CLINICAL HISTORY:  Abdominal pain, acute, nonlocalized;    TECHNIQUE:  Low dose axial images, sagittal and coronal reformations were obtained from the lung bases to the pubic symphysis following the IV administration of 100 mL of Omnipaque 350.    COMPARISON:  None    FINDINGS:  Heart: Normal size as far as seen. No effusion as far as seen.    Lung Bases: Clear.    Liver: Fatty infiltration of the liver.  No focal lesions.    Gallbladder: Gallstone    Bile Ducts: No dilatation.    Pancreas: No mass. No peripancreatic fat stranding.    Spleen: Normal.    Adrenals: Normal.    Kidneys/Ureters: Normal enhancement.  No mass or  hydroureteronephrosis.    Bladder: No wall thickening.    Reproductive organs: Right adnexal  structure measuring 3.0 x 4.1 x 4.1 cm suggestive of complex right ovarian structure.    GI Tract/Mesentery: No evidence of bowel obstruction or inflammation.  No evidence of acute appendicitis.  Hyperdense curvilinear content in the posterior aspect of the gastric fundus may relate to ingested material.    Peritoneal Space: No ascites or free air.    Retroperitoneum: No significant adenopathy.    Abdominal wall: Normal.    Vasculature: No aneurysm.    Bones: No acute fracture. No suspicious lytic or sclerotic lesions.                                       CT Head Without Contrast (Final result)  Result time 08/14/24 18:02:19      Final result by Enrique Ware MD (08/14/24 18:02:19)                   Impression:      1.  Negative for acute intracranial process. Negative for hemorrhage, or skull fracture.    2.  Mild ethmoid air cell disease.    3.  Nonemergent findings as described above.    All CT scans at this facility are performed  using dose modulation techniques as appropriate to performed exam including the following:  automated exposure control; adjustment of mA and/or kV according to the patients size (this includes techniques or standardized protocols for targeted exams where dose is matched to indication/reason for exam: i.e. extremities or head);  iterative reconstruction technique.      Electronically signed by: Enrique Ware MD  Date:    08/14/2024  Time:    18:02               Narrative:    EXAMINATION:  CT HEAD WITHOUT CONTRAST    CLINICAL HISTORY:  Headache, new or worsening (Age >= 50y);    TECHNIQUE:  Axial images through the brain and posterior fossa were obtained without the use of IV contrast.  Sagittal and coronal reconstructions are provided for review.    COMPARISON:  No comparison studies are available.    FINDINGS:  The ventricles are midline and the CSF spaces are normal. The gray-white matter junction is well preserved. Negative for intracranial vascular abnormalities. Negative for  mass, mass effect, cerebral edema, hemorrhage or abnormal fluid collections.  Tiny cavum septum pellucidum.  Falx calcifications.  Arachnoid granulation calcifications.    The skull and scalp are  intact.  Hypoplastic frontal sinuses.  Mild ethmoid air cell disease.  The rest of the paranasal sinuses, mastoid air cells, middle ears and ear canals are clear. The globes are intact.                                       Medications   ibuprofen tablet 800 mg (800 mg Oral Given 8/14/24 1721)   prochlorperazine injection Soln 10 mg (10 mg Intravenous Given 8/14/24 1804)   diphenhydrAMINE injection 25 mg (25 mg Intravenous Given 8/14/24 1804)   0.9%  NaCl infusion (1,000 mLs Intravenous New Bag 8/14/24 1803)   potassium chloride SA CR tablet 40 mEq (40 mEq Oral Given 8/14/24 1850)   iohexoL (OMNIPAQUE 350) injection 80 mL (80 mLs Intravenous Given 8/14/24 1931)     Medical Decision Making  Patient presents to emergency room for multiple complaints including abdominal pain and headache.  Pulse of 111 initially.  Vital signs otherwise stable.  Physical exam as stated above.    Differential Diagnosis includes, but is not limited to ischemic stroke, hemorrhagic stroke, subarachnoid hemorrhage/ruptured aneurysm, intracranial lesion/mass, meningitis/encephalitis, epidural hematoma, subdural hematoma, pseudotumor cerebri, hypertensive encephalopathy, MI/ACS, head trauma/contusion, concussion, sinus headache, dehydration, anxiety, medication non-compliance, or primary headache (tension/cluster/migraine).  Patient neurologically intact.  I do not suspect CVA at this time.  Headache is gradual.  Unlikely subarachnoid hemorrhoid.  Patient afebrile.  I do not suspect meningitis/encephalitis.  No trauma that would indicate epidural or subdural hematoma.  Blood pressure stable and without indication of hypertensive encephalopathy.  No chest pain or other clinical factors that would suggest MI/ACS.     Patient handed off to Rosaline Sánchez  HUMBLE. Additional notes in ED course.     Problems Addressed:  Calculus of gallbladder without cholecystitis without obstruction: acute illness or injury  Cyst of right ovary: acute illness or injury  Generalized abdominal pain: acute illness or injury  Tachycardia: acute illness or injury    Amount and/or Complexity of Data Reviewed  External Data Reviewed: notes.     Details: Patient currently on metformin.  No IM shots recorded.  Patient currently sees Dr. Doyle with primary care.  This is an outside facility, so records unable to be accessed.  Last appointment in 05/2024.  Labs: ordered. Decision-making details documented in ED Course.  Radiology: ordered. Decision-making details documented in ED Course.  ECG/medicine tests: ordered. Decision-making details documented in ED Course.    Risk  OTC drugs.  Prescription drug management.  Risk Details: Comorbidities taken into consideration during the patient's evaluation and treatment include anemia, diabetes mellitus, and hypertension.  Social determinants of health taken into consideration during development of our treatment plan include difficulty in obtaining follow-up, obtaining medications, health literacy, access to healthy options for preventative/conservative management, and/or support systems due to, but not limited to, transportation limitations, socioeconomic status, and environmental factors.                ED Course as of 08/15/24 0804   Wed Aug 14, 2024   1732 POCT urine pregnancy  Urine pregnancy negative. [BJ]   1805 CT Head Without Contrast  Impression:     1.  Negative for acute intracranial process. Negative for hemorrhage, or skull fracture.     2.  Mild ethmoid air cell disease.     3.  Nonemergent findings as described above. [BJ]   1816 Urinalysis, Reflex to Urine Culture Urine, Clean Catch(!)  Urinalysis without leukocytes.  1+ bilirubin and trace ketones.  Trace protein. [BJ]   1818 CBC auto differential(!)  CBC relatively unremarkable.   No leukocytosis.  Hemoglobin stable at 12.1.  Platelet count within normal limits. [BJ]   1826 EKG 12-lead  Independent interpretation of EKG normal sinus rhythm at 86 beats per minute.  No ST elevations.  T-wave inversion noted in lead III and V3.  This is seen on previous EKG done on 10/2019. [BJ]   1832 Magnesium  Magnesium within normal limits. [BJ]   1832 Lipase  Lipase within normal limits. [BJ]   1832 Comprehensive metabolic panel(!)  CMP relatively unremarkable.  Potassium slightly decreased to 3.1.  BUN slightly increased to 20.  Creatinine within normal limits.  LFTs within normal limits. [BJ]   1833 Potassium(!): 3.1  Oral potassium ordered. [BJ]   1845 Pending swabs. [BJ]   1857 Pulse: 82  Improvement. [BJ]   1901 On re-evaluation, patient states that she is feeling much better.  Resting comfortably in bed. [BJ]   1916 COVID-19 Rapid Screening  COVID negative. [BJ]   1916 Patient care transferred to Rosaline Sánchez PA-C at shift change. I discussed the relevant history, physical exam, laboratory findings, radiological findings and anticipated disposition plan. On coming staff to formally complete visit disposition, review any pending laboratory/radiological results and to addend treatment plan as necessary. [BJ]   1956 CT Abdomen Pelvis With IV Contrast NO Oral Contrast  I have read the radiologist's report and agree with their findings.  Impression:     No acute process     Complex right adnexal cystic structure     Gallstone     Fatty infiltration liver.       [LH]   2017 On re-evaluation, patient reports her abdominal pain has resolved.  Patient was informed of the incidental findings on CT today.  Advised patient the incidental findings are most likely not the cause of her abdominal pain today.  We will have patient follow up with Gynecology with regards to ovarian cyst.  Patient will follow up with primary care as well.  We will discharge patient with medication for pain control.  All the patient's  questions were asked and answered.     My overall impression is generalized abdominal pain, gallstone, right ovarian cyst. Differential Diagnoses: including but not limited to acute constipation, early appendicitis, colitis, diverticulitis, volvulus, small bowel obstruction, pancreatitis, mesenteric ischemia, gastroenteritis, peritonititis, AAA, large bowel obstruction/volvulus, IBS, DKA, hernia, kidney stone, intra-abdominal infection vs abscess, abdominal perforation, nephrolithiasis     There does not appear to be any indication for further emergent testing, observation, or hospitalization at this time. A mutual shared decision making discussion was had with the patient. Patient appears stable for and is comfortable with discharge home. The diagnosis, treatment plan, instructions for follow-up as well as ED return precautions were discussed. Advised to follow-up with PCP for outpatient follow-up in 2-3 days. Signs and symptoms that would warrant immediate return to ED were reviewed prior to discharge. All questions and concerns were asked, answered, and addressed. Patient expressed understanding and agreement with the plan.  [LH]      ED Course User Index  [BJ] Madelin Baker PA-C  [] Rosaline Sánchez PA-C                           Clinical Impression:  Final diagnoses:  [R10.84] Generalized abdominal pain (Primary)  [R00.0] Tachycardia  [N83.201] Cyst of right ovary  [K80.20] Calculus of gallbladder without cholecystitis without obstruction          ED Disposition Condition    Discharge Stable          ED Prescriptions       Medication Sig Dispense Start Date End Date Auth. Provider    acetaminophen (TYLENOL) 500 MG tablet Take 2 tablets (1,000 mg total) by mouth 4 (four) times daily. for 14 days 112 tablet 8/14/2024 8/28/2024 Rosaline Sánchez PA-C    ibuprofen (ADVIL,MOTRIN) 800 MG tablet Take 1 tablet (800 mg total) by mouth every 6 (six) hours as needed for Pain. 20 tablet 8/14/2024 -- Rosaline Sánchez PA-C    dicyclomine  (BENTYL) 20 mg tablet Take 1 tablet (20 mg total) by mouth 2 (two) times daily. for 10 days 20 tablet 8/14/2024 8/24/2024 Rosaline Sánchez PA-C          Follow-up Information    None         This note was partially created using [employer] Voice Recognition software. Typographical and content errors may occur with this process. While efforts are made to detect and correct such errors, in some cases errors will persist. For this reason, wording in this document should be considered in the proper context and not strictly verbatim.        Madelin Baker PA-C  08/15/24 0804

## 2024-08-14 NOTE — ED NOTES
Kaila EATON at bedside, pt tearful and c/o headache, blurred vision, abdomen pain. Family member at the bedside states she had a root canal on Tuesday, and was suppose to rest today, and patient has been non-stop today. Pt admits to not taking medication for pain.

## 2024-08-15 NOTE — PROVIDER PROGRESS NOTES - EMERGENCY DEPT.
Encounter Date: 8/14/2024    ED Physician Progress Notes          ED Course as of 08/14/24 2023   Wed Aug 14, 2024   1732 POCT urine pregnancy  Urine pregnancy negative. [BJ]   1805 CT Head Without Contrast  Impression:     1.  Negative for acute intracranial process. Negative for hemorrhage, or skull fracture.     2.  Mild ethmoid air cell disease.     3.  Nonemergent findings as described above. [BJ]   1816 Urinalysis, Reflex to Urine Culture Urine, Clean Catch(!)  Urinalysis without leukocytes.  1+ bilirubin and trace ketones.  Trace protein. [BJ]   1818 CBC auto differential(!)  CBC relatively unremarkable.  No leukocytosis.  Hemoglobin stable at 12.1.  Platelet count within normal limits. [BJ]   1826 EKG 12-lead  Independent interpretation of EKG normal sinus rhythm at 86 beats per minute.  No ST elevations.  T-wave inversion noted in lead III and V3.  This is seen on previous EKG done on 10/2019. [BJ]   1832 Magnesium  Magnesium within normal limits. [BJ]   1832 Lipase  Lipase within normal limits. [BJ]   1832 Comprehensive metabolic panel(!)  CMP relatively unremarkable.  Potassium slightly decreased to 3.1.  BUN slightly increased to 20.  Creatinine within normal limits.  LFTs within normal limits. [BJ]   1833 Potassium(!): 3.1  Oral potassium ordered. [BJ]   1845 Pending swabs. [BJ]   1857 Pulse: 82  Improvement. [BJ]   1901 On re-evaluation, patient states that she is feeling much better.  Resting comfortably in bed. [BJ]   1916 COVID-19 Rapid Screening  COVID negative. [BJ]   1916 Patient care transferred to Rosaline Sánchez PA-C at shift change. I discussed the relevant history, physical exam, laboratory findings, radiological findings and anticipated disposition plan. On coming staff to formally complete visit disposition, review any pending laboratory/radiological results and to addend treatment plan as necessary. [BJ]   1956 CT Abdomen Pelvis With IV Contrast NO Oral Contrast  I have read the radiologist's  report and agree with their findings.  Impression:     No acute process     Complex right adnexal cystic structure     Gallstone     Fatty infiltration liver.       [LH]   2017 On re-evaluation, patient reports her abdominal pain has resolved.  Patient was informed of the incidental findings on CT today.  Advised patient the incidental findings are most likely not the cause of her abdominal pain today.  We will have patient follow up with Gynecology with regards to ovarian cyst.  Patient will follow up with primary care as well.  We will discharge patient with medication for pain control.  All the patient's questions were asked and answered.     My overall impression is generalized abdominal pain, gallstone, right ovarian cyst. Differential Diagnoses: including but not limited to acute constipation, early appendicitis, colitis, diverticulitis, volvulus, small bowel obstruction, pancreatitis, mesenteric ischemia, gastroenteritis, peritonititis, AAA, large bowel obstruction/volvulus, IBS, DKA, hernia, kidney stone, intra-abdominal infection vs abscess, abdominal perforation, nephrolithiasis     There does not appear to be any indication for further emergent testing, observation, or hospitalization at this time. A mutual shared decision making discussion was had with the patient. Patient appears stable for and is comfortable with discharge home. The diagnosis, treatment plan, instructions for follow-up as well as ED return precautions were discussed. Advised to follow-up with PCP for outpatient follow-up in 2-3 days. Signs and symptoms that would warrant immediate return to ED were reviewed prior to discharge. All questions and concerns were asked, answered, and addressed. Patient expressed understanding and agreement with the plan.  [LH]      ED Course User Index  [BJ] Madelin Baker PA-C  [LH] Rosaline Sánchez PA-C

## 2024-08-15 NOTE — DISCHARGE INSTRUCTIONS
Please return to the Emergency Department for any new or worsening symptoms including: worsening abdominal pain, dark\black\bloody bowel movements, vomiting blood, hard abdomen, fever, chest pain, shortness of breath, loss of consciousness or any other concerns.     Please follow up with your Primary Care Provider within in the week. If you do not have a Primary Care Provider, you may contact the one listed on your discharge paperwork or you may also call the [facility] Appointment Desk at [phone] to schedule an appointment with a Primary Care Provider.

## 2024-08-16 LAB
OHS QRS DURATION: 88 MS
OHS QTC CALCULATION: 437 MS

## 2025-01-27 DIAGNOSIS — Z13.820 SCREENING FOR OSTEOPOROSIS: ICD-10-CM

## 2025-01-27 DIAGNOSIS — Z78.0 POSTMENOPAUSAL STATUS (AGE-RELATED) (NATURAL): Primary | ICD-10-CM

## 2025-07-12 ENCOUNTER — HOSPITAL ENCOUNTER (EMERGENCY)
Facility: HOSPITAL | Age: 55
Discharge: HOME | End: 2025-07-12
Attending: FAMILY MEDICINE

## 2025-07-12 VITALS
RESPIRATION RATE: 17 BRPM | HEART RATE: 99 BPM | TEMPERATURE: 98 F | SYSTOLIC BLOOD PRESSURE: 161 MMHG | DIASTOLIC BLOOD PRESSURE: 92 MMHG | BODY MASS INDEX: 22.08 KG/M2 | HEIGHT: 62 IN | WEIGHT: 120 LBS | OXYGEN SATURATION: 99 %

## 2025-07-12 DIAGNOSIS — K80.20 CALCULUS OF GALLBLADDER WITHOUT CHOLECYSTITIS WITHOUT OBSTRUCTION: ICD-10-CM

## 2025-07-12 DIAGNOSIS — D25.9 UTERINE LEIOMYOMA, UNSPECIFIED LOCATION: Primary | ICD-10-CM

## 2025-07-12 DIAGNOSIS — E27.9 ADRENAL NODULE: ICD-10-CM

## 2025-07-12 LAB
ABSOLUTE EOSINOPHIL (OHS): 0.04 K/UL
ABSOLUTE MONOCYTE (OHS): 0.44 K/UL (ref 0.3–1)
ABSOLUTE NEUTROPHIL COUNT (OHS): 2.5 K/UL (ref 1.8–7.7)
ALBUMIN SERPL BCP-MCNC: 4 G/DL (ref 3.5–5.2)
ALP SERPL-CCNC: 50 UNIT/L (ref 38–126)
ALT SERPL W/O P-5'-P-CCNC: 15 UNIT/L (ref 10–44)
ANION GAP (OHS): 6 MMOL/L (ref 8–16)
AST SERPL-CCNC: 29 UNIT/L (ref 15–46)
BASOPHILS # BLD AUTO: 0.03 K/UL
BASOPHILS NFR BLD AUTO: 0.6 %
BILIRUB SERPL-MCNC: 1.1 MG/DL (ref 0.1–1)
BILIRUB UR QL STRIP.AUTO: ABNORMAL
BUN SERPL-MCNC: 25 MG/DL (ref 7–17)
CALCIUM SERPL-MCNC: 8.9 MG/DL (ref 8.7–10.5)
CHLORIDE SERPL-SCNC: 102 MMOL/L (ref 95–110)
CLARITY UR: CLEAR
CO2 SERPL-SCNC: 29 MMOL/L (ref 23–29)
COLOR UR AUTO: YELLOW
CREAT SERPL-MCNC: 0.9 MG/DL (ref 0.5–1.4)
ERYTHROCYTE [DISTWIDTH] IN BLOOD BY AUTOMATED COUNT: 12.1 % (ref 11.5–14.5)
GFR SERPLBLD CREATININE-BSD FMLA CKD-EPI: >60 ML/MIN/1.73/M2
GLUCOSE SERPL-MCNC: 96 MG/DL (ref 70–110)
GLUCOSE UR QL STRIP: NEGATIVE
HCT VFR BLD AUTO: 35 % (ref 37–48.5)
HGB BLD-MCNC: 12.1 GM/DL (ref 12–16)
HGB UR QL STRIP: NEGATIVE
IMM GRANULOCYTES # BLD AUTO: 0.01 K/UL (ref 0–0.04)
IMM GRANULOCYTES NFR BLD AUTO: 0.2 % (ref 0–0.5)
KETONES UR QL STRIP: ABNORMAL
LEUKOCYTE ESTERASE UR QL STRIP: NEGATIVE
LIPASE SERPL-CCNC: 152 U/L (ref 23–300)
LYMPHOCYTES # BLD AUTO: 1.86 K/UL (ref 1–4.8)
MCH RBC QN AUTO: 30 PG (ref 27–31)
MCHC RBC AUTO-ENTMCNC: 34.6 G/DL (ref 32–36)
MCV RBC AUTO: 87 FL (ref 82–98)
NITRITE UR QL STRIP: NEGATIVE
NT-PROBNP SERPL-MCNC: 25 PG/ML (ref 5–900)
NUCLEATED RBC (/100WBC) (OHS): 0 /100 WBC
PH UR STRIP: 6 [PH]
PLATELET # BLD AUTO: 172 K/UL (ref 150–450)
PMV BLD AUTO: 11.6 FL (ref 9.2–12.9)
POTASSIUM SERPL-SCNC: 3.8 MMOL/L (ref 3.5–5.1)
PROT SERPL-MCNC: 7.2 GM/DL (ref 6–8.4)
PROT UR QL STRIP: NEGATIVE
RBC # BLD AUTO: 4.04 M/UL (ref 4–5.4)
RELATIVE EOSINOPHIL (OHS): 0.8 %
RELATIVE LYMPHOCYTE (OHS): 38.1 % (ref 18–48)
RELATIVE MONOCYTE (OHS): 9 % (ref 4–15)
RELATIVE NEUTROPHIL (OHS): 51.3 % (ref 38–73)
SODIUM SERPL-SCNC: 137 MMOL/L (ref 136–145)
SP GR UR STRIP: 1.01
UROBILINOGEN UR STRIP-ACNC: NEGATIVE EU/DL
WBC # BLD AUTO: 4.88 K/UL (ref 3.9–12.7)

## 2025-07-12 PROCEDURE — 25000003 PHARM REV CODE 250: Mod: ER | Performed by: FAMILY MEDICINE

## 2025-07-12 PROCEDURE — 96375 TX/PRO/DX INJ NEW DRUG ADDON: CPT | Mod: ER

## 2025-07-12 PROCEDURE — 80053 COMPREHEN METABOLIC PANEL: CPT | Mod: ER | Performed by: FAMILY MEDICINE

## 2025-07-12 PROCEDURE — 63600175 PHARM REV CODE 636 W HCPCS: Mod: ER | Performed by: FAMILY MEDICINE

## 2025-07-12 PROCEDURE — 96361 HYDRATE IV INFUSION ADD-ON: CPT | Mod: ER

## 2025-07-12 PROCEDURE — 99285 EMERGENCY DEPT VISIT HI MDM: CPT | Mod: 25,ER

## 2025-07-12 PROCEDURE — 25500020 PHARM REV CODE 255: Mod: ER | Performed by: FAMILY MEDICINE

## 2025-07-12 PROCEDURE — 96372 THER/PROPH/DIAG INJ SC/IM: CPT | Performed by: FAMILY MEDICINE

## 2025-07-12 PROCEDURE — 85025 COMPLETE CBC W/AUTO DIFF WBC: CPT | Mod: ER | Performed by: FAMILY MEDICINE

## 2025-07-12 PROCEDURE — 81003 URINALYSIS AUTO W/O SCOPE: CPT | Mod: ER | Performed by: EMERGENCY MEDICINE

## 2025-07-12 PROCEDURE — 83880 ASSAY OF NATRIURETIC PEPTIDE: CPT | Mod: ER | Performed by: FAMILY MEDICINE

## 2025-07-12 PROCEDURE — 96374 THER/PROPH/DIAG INJ IV PUSH: CPT | Mod: ER

## 2025-07-12 PROCEDURE — 83690 ASSAY OF LIPASE: CPT | Mod: ER | Performed by: FAMILY MEDICINE

## 2025-07-12 RX ORDER — DICYCLOMINE HYDROCHLORIDE 10 MG/ML
20 INJECTION INTRAMUSCULAR
Status: COMPLETED | OUTPATIENT
Start: 2025-07-12 | End: 2025-07-12

## 2025-07-12 RX ORDER — KETOROLAC TROMETHAMINE 10 MG/1
10 TABLET, FILM COATED ORAL EVERY 8 HOURS PRN
Qty: 15 TABLET | Refills: 0 | Status: SHIPPED | OUTPATIENT
Start: 2025-07-12

## 2025-07-12 RX ORDER — HYDROCHLOROTHIAZIDE 25 MG/1
25 TABLET ORAL DAILY
COMMUNITY

## 2025-07-12 RX ORDER — TIRZEPATIDE 12.5 MG/.5ML
12.5 INJECTION, SOLUTION SUBCUTANEOUS WEEKLY
COMMUNITY
Start: 2024-10-07

## 2025-07-12 RX ORDER — ONDANSETRON HYDROCHLORIDE 2 MG/ML
4 INJECTION, SOLUTION INTRAVENOUS
Status: COMPLETED | OUTPATIENT
Start: 2025-07-12 | End: 2025-07-12

## 2025-07-12 RX ORDER — ONDANSETRON 4 MG/1
4 TABLET, ORALLY DISINTEGRATING ORAL EVERY 6 HOURS PRN
Qty: 12 TABLET | Refills: 0 | Status: SHIPPED | OUTPATIENT
Start: 2025-07-12

## 2025-07-12 RX ADMIN — IOHEXOL 75 ML: 350 INJECTION, SOLUTION INTRAVENOUS at 05:07

## 2025-07-12 RX ADMIN — DICYCLOMINE HYDROCHLORIDE 20 MG: 10 INJECTION, SOLUTION INTRAMUSCULAR at 03:07

## 2025-07-12 RX ADMIN — ONDANSETRON 4 MG: 2 INJECTION INTRAMUSCULAR; INTRAVENOUS at 03:07

## 2025-07-12 RX ADMIN — SODIUM CHLORIDE 1000 ML: 9 INJECTION, SOLUTION INTRAVENOUS at 03:07

## 2025-07-12 NOTE — ED PROVIDER NOTES
"Encounter Date: 2025       History     Chief Complaint   Patient presents with    Abdominal Pain    Back Pain     Pt reports intermittent abd pain and nausea X 2 days. Also c/o low back pain for "a minute." Last BM yesterday. No meds.      55-year-old female complains of chronic mid abdominal pain for last 6 months.  Pain is intermittent and cramping in nature.  Patient claims she was seen by her PCP but did not give her anything.  She was in ER few months ago and was diagnosed with ovarian cyst.  She denies having vomiting or diarrhea.  Normal BM today.  She had banana and liquid diet today.    The history is provided by the patient.     Review of patient's allergies indicates:  No Known Allergies  Past Medical History:   Diagnosis Date    Anemia     Diabetes mellitus     Hypertension      Past Surgical History:   Procedure Laterality Date    ANKLE SURGERY Right      SECTION       Family History   Problem Relation Name Age of Onset    Breast cancer Mother      No Known Problems Father      Breast cancer Paternal Aunt       Social History[1]  Review of Systems   Gastrointestinal:  Positive for abdominal pain.   All other systems reviewed and are negative.      Physical Exam     Initial Vitals [25 1459]   BP Pulse Resp Temp SpO2   126/77 99 17 98.1 °F (36.7 °C) 99 %      MAP       --         Physical Exam    Nursing note and vitals reviewed.  Constitutional: Vital signs are normal. She appears well-developed and well-nourished. She is active. No distress.   HENT:   Head: Normocephalic.   Nose: Nose normal. Mouth/Throat: Oropharynx is clear and moist and mucous membranes are normal.   Eyes: Conjunctivae, EOM and lids are normal.   Neck: Neck supple.   Normal range of motion.  Cardiovascular:  Normal rate, regular rhythm, S1 normal, S2 normal and normal heart sounds.           Pulmonary/Chest: Breath sounds normal. No respiratory distress. She has no wheezes. She has no rhonchi. She has no rales. She " exhibits no tenderness.   Abdominal: Abdomen is soft. Bowel sounds are normal. She exhibits no distension. There is abdominal tenderness in the periumbilical area.     There is guarding. There is no rebound.   Musculoskeletal:         General: Normal range of motion.      Right upper arm: Normal.      Left upper arm: Normal.      Cervical back: Normal range of motion and neck supple.      Right lower leg: Normal.      Left lower leg: Normal.     Neurological: She is alert and oriented to person, place, and time. She has normal strength. GCS score is 15. GCS eye subscore is 4. GCS verbal subscore is 5. GCS motor subscore is 6.   Skin: Skin is warm. Capillary refill takes less than 2 seconds.   Psychiatric: She has a normal mood and affect. Her speech is normal and behavior is normal. Thought content normal. Cognition and memory are normal.         ED Course   Procedures  Labs Reviewed   COMPREHENSIVE METABOLIC PANEL - Abnormal       Result Value    Sodium 137      Potassium 3.8      Chloride 102      CO2 29      Glucose 96      BUN 25 (*)     Creatinine 0.9      Calcium 8.9      Protein Total 7.2      Albumin 4.0      Bilirubin Total 1.1 (*)     ALP 50      AST 29      ALT 15      Anion Gap 6 (*)     eGFR >60     CBC WITH DIFFERENTIAL - Abnormal    WBC 4.88      RBC 4.04      HGB 12.1      HCT 35.0 (*)     MCV 87      MCH 30.0      MCHC 34.6      RDW 12.1      Platelet Count 172      MPV 11.6      Nucleated RBC 0      Neut % 51.3      Lymph % 38.1      Mono % 9.0      Eos % 0.8      Basophil % 0.6      Imm Grans % 0.2      Neut # 2.50      Lymph # 1.86      Mono # 0.44      Eos # 0.04      Baso # 0.03      Imm Grans # 0.01     URINALYSIS, REFLEX TO URINE CULTURE - Abnormal    Color, UA Yellow      Appearance, UA Clear      pH, UA 6.0      Spec Grav UA 1.015      Protein, UA Negative      Glucose, UA Negative      Ketones, UA Trace (*)     Bilirubin, UA 1+ (*)     Blood, UA Negative      Nitrites, UA Negative       Urobilinogen, UA Negative      Leukocyte Esterase, UA Negative     LIPASE - Normal    Lipase Level 152     NT-PRO NATRIURETIC PEPTIDE - Normal    NT-proBNP 25     CBC W/ AUTO DIFFERENTIAL    Narrative:     The following orders were created for panel order CBC auto differential.  Procedure                               Abnormality         Status                     ---------                               -----------         ------                     CBC with Differential[[id number]]       Abnormal            Final result                 Please view results for these tests on the individual orders.   GREY TOP URINE HOLD          Imaging Results              CT Abdomen Pelvis With IV Contrast NO Oral Contrast (Final result)  Result time 07/12/25 18:35:56      Final result by Mario Alberto Dimas III, MD (07/12/25 18:35:56)                   Impression:      1. No acute abnormality identified in the abdomen or pelvis.  2.  Cholelithiasis.  Uterine fibroid.    Finalized on: 7/12/2025 6:35 PM By:  Mario Alberto Dimas MD  [facility]# [id number]      2025-07-12 18:38:06.726     [facility]               Narrative:      EXAM:  CT ABDOMEN PELVIS WITH IV CONTRAST    CLINICAL HISTORY: Epigastric pain;    TECHNIQUE: Routine contrast-enhanced CT protocol of the abdomen and pelvis was performed. All CT scans at [this location] are performed using dose modulation techniques as appropriate to a performed exam including the following: automated exposure control; adjustment of the mA and/or kV according to patient size (this includes techniques or standardized protocols for targeted exams where dose is matched to indication / reason for exam; i.e. extremities or head); use of iterative reconstruction technique.    Comparison: 08/14/2024    FINDINGS:    Lung Bases:  No acute findings.    Liver:  Unremarkable.    Gallbladder and bile ducts:  Cholelithiasis without acute cholecystitis..    Pancreas:  Unremarkable.    Spleen:  Unremarkable.    Adrenals:   Unremarkable.    Kidneys and ureters:  Unremarkable.    Stomach and Bowel: No acute findings.    Appendix: No evidence of acute appendicitis.    Bladder:  Unremarkable.    Reproductive organs:  Stable 3.7 cm subserosal right uterine fibroid and small adjacent nabothian cysts.  No acute findings..    Bones:  No acute osseous abnormality or suspicious bone lesions identified.    Vasculature:  Negative for aortic aneurysm.    Miscellaneous: No free fluid, free air or abscess.  No pathologic adenopathy.                                         Medications   sodium chloride 0.9% bolus 1,000 mL 1,000 mL (0 mLs Intravenous Stopped 7/12/25 1806)   ondansetron injection 4 mg (4 mg Intravenous Given 7/12/25 1542)   dicyclomine injection 20 mg (20 mg Intramuscular Given 7/12/25 1541)   iohexoL (OMNIPAQUE 350) injection 75 mL (75 mLs Intravenous Given 7/12/25 1716)     Medical Decision Making  Differential diagnosis include not limited to gastroenteritis, irritable bowel syndrome, inflammatory bowel disease Crohn's/ulcerative colitis, gastritis, cholelithiasis, cholecystitis, dissection, aneurysm,  Initiated labs, CT scan.  Patient is given Bentyl for pain, Zofran for nausea.  Labs evaluated CMP within normal range CBC normal, lipase normal, BNP normal.  Pending CT scan patient is checked out to  at shift change.    Amount and/or Complexity of Data Reviewed  External Data Reviewed: radiology.     Details: 8/2024-Ovarian Cyst  Labs: ordered. Decision-making details documented in ED Course.  Radiology: ordered. Decision-making details documented in ED Course.    Risk  Prescription drug management.                                          Clinical Impression:  Final diagnoses:  [D25.9] Uterine leiomyoma, unspecified location (Primary)  [E27.9] Adrenal nodule  [K80.20] Calculus of gallbladder without cholecystitis without obstruction          ED Disposition Condition    Discharge Stable          ED Prescriptions        Medication Sig Dispense Start Date End Date Auth. Provider    ondansetron (ZOFRAN-ODT) 4 MG TbDL Take 1 tablet (4 mg total) by mouth every 6 (six) hours as needed (nausea). 12 tablet 7/12/2025 -- Husam Osman MD    ketorolac (TORADOL) 10 mg tablet Take 1 tablet (10 mg total) by mouth every 8 (eight) hours as needed for Pain. 15 tablet 7/12/2025 -- Husam Osman MD          Follow-up Information       Follow up With Specialties Details Why Contact Info    General Surgery  Go to  As scheduled, for reassessment     Ines Doyle FNP Family Medicine Schedule an appointment as soon as possible for a visit  reassessment; referral to surgery (gallstones) and GYN (fibroids); further imaging of adrenal nodule [address] [place] [address] [phone]      [facility] - Emergency Dept Emergency Medicine Go to  As needed, If symptoms worsen [address] [facility]  Emergency Department  [facility] [address] [phone]                   [1]   Social History  Tobacco Use    Smoking status: Never    Smokeless tobacco: Never   Substance Use Topics    Alcohol use: No    Drug use: No        Peter Wolf MD  07/13/25 0640

## 2025-07-13 NOTE — ED PROVIDER NOTES
"The patient was received from the off-going emergency room physician Dr. Wolf at 6:00PM.  All pertinent details presently available from the patient encounter were discussed along with the expected plan for disposition.      Vitals:    07/12/25 1459 07/12/25 1914   BP: 126/77 (!) 161/92   Pulse: 99    Resp: 17    Temp: 98.1 °F (36.7 °C)    SpO2: 99%    Weight: 54.4 kg (120 lb)    Height: 5' 2" (1.575 m)        Labs Reviewed   COMPREHENSIVE METABOLIC PANEL - Abnormal       Result Value    Sodium 137      Potassium 3.8      Chloride 102      CO2 29      Glucose 96      BUN 25 (*)     Creatinine 0.9      Calcium 8.9      Protein Total 7.2      Albumin 4.0      Bilirubin Total 1.1 (*)     ALP 50      AST 29      ALT 15      Anion Gap 6 (*)     eGFR >60     CBC WITH DIFFERENTIAL - Abnormal    WBC 4.88      RBC 4.04      HGB 12.1      HCT 35.0 (*)     MCV 87      MCH 30.0      MCHC 34.6      RDW 12.1      Platelet Count 172      MPV 11.6      Nucleated RBC 0      Neut % 51.3      Lymph % 38.1      Mono % 9.0      Eos % 0.8      Basophil % 0.6      Imm Grans % 0.2      Neut # 2.50      Lymph # 1.86      Mono # 0.44      Eos # 0.04      Baso # 0.03      Imm Grans # 0.01     URINALYSIS, REFLEX TO URINE CULTURE - Abnormal    Color, UA Yellow      Appearance, UA Clear      pH, UA 6.0      Spec Grav UA 1.015      Protein, UA Negative      Glucose, UA Negative      Ketones, UA Trace (*)     Bilirubin, UA 1+ (*)     Blood, UA Negative      Nitrites, UA Negative      Urobilinogen, UA Negative      Leukocyte Esterase, UA Negative     LIPASE - Normal    Lipase Level 152     NT-PRO NATRIURETIC PEPTIDE - Normal    NT-proBNP 25     CBC W/ AUTO DIFFERENTIAL    Narrative:     The following orders were created for panel order CBC auto differential.  Procedure                               Abnormality         Status                     ---------                               -----------         ------                     CBC with " Differential[[id number]]       Abnormal            Final result                 Please view results for these tests on the individual orders.   [facility] URINE HOLD           All available results from the labs ordered were independently reviewed. with findings as follows: CMP, CBC, Lipase, and UA unremarkable.     CT Abdomen Pelvis With IV Contrast NO Oral Contrast   Final Result      1. No acute abnormality identified in the abdomen or pelvis.   2.  Cholelithiasis.  Uterine fibroid.      Finalized on: 7/12/2025 6:35 PM By:  Mario Alberto Dimas MD   [facility]# [id number]      2025-07-12 18:38:06.726     [facility]           Imaging independently reviewed. with findings as follows: CT AP notable for chronic fibroids, chronic gallstones, and R-sided leslee-adrenal nodule.               All findings have been reviewed with the patient/family and assessment at this time does not suggest toxicity or an acute abdomen.  Patient has been cautioned that an unremarkable exam does not completely exclude the possibility of an early/developing process, and any change or progression of symptoms should prompt immediate return to the department for further investigation.     On final assessment, the patient appears suitable for discharge.  We have discussed the findings for the adrenal lesion which will need further investigation outpatient.  We have also discussed the persistence of the fibroid tumors previously identified.  And finally persistence of the gallstones seen on CAT scan which are the likely source of her chronically recurring nausea and epigastric pain.  Accordingly I have placed an ambulatory referral for General surgery follow-up.  Patient has been counseled regarding appropriate dietary changes to limit her symptoms.    I see no indication of an emergent process beyond that addressed during our encounter but have duly counseled the patient/family regarding the need for prompt follow-up as well as the indications that should prompt  immediate return to the emergency room.  The patient/family has been provided with language -specific verbal and printed direction regarding our final diagnosis(es) as well as instructions regarding use of OTC and/or Rx medications intended to manage the patient's aforementioned conditions including:  ED Prescriptions       Medication Sig Dispense Start Date End Date Auth. Provider    ondansetron (ZOFRAN-ODT) 4 MG TbDL Take 1 tablet (4 mg total) by mouth every 6 (six) hours as needed (nausea). 12 tablet 7/12/2025 -- Husam Osman MD    ketorolac (TORADOL) 10 mg tablet Take 1 tablet (10 mg total) by mouth every 8 (eight) hours as needed for Pain. 15 tablet 7/12/2025 -- Husam Osman MD              Patient has been advised of the following recommended follow-up instructions:  Follow-up Information       Follow up With Specialties Details Why Contact Info    General Surgery  Go to  As scheduled, for reassessment     Ines Doyle FNP Family Medicine Schedule an appointment as soon as possible for a visit  reassessment; referral to surgery (gallstones) and GYN (fibroids); further imaging of adrenal nodule [address] [facility] [address]  [phone]      [facility] - Emergency Dept Emergency Medicine Go to  As needed, If symptoms worsen [address] [facility]  Emergency Department  [facility] [address] [phone]          The patient/family communicates understanding of all this information and all remaining questions and concerns were addressed at this time.      Referrals:  Orders Placed This Encounter   Procedures    Ambulatory referral/consult to General Surgery     Standing Status:   Future     Expected Date:   7/19/2025     Expiration Date:   8/12/2026     Referral Priority:   Routine     Referral Type:   Consultation     Referral Reason:   Specialty Services Required     Requested Specialty:   General Surgery     Number of Visits Requested:   1        CLINICAL IMPRESSION    ICD-10-CM ICD-9-CM   1. Uterine leiomyoma, unspecified location  D25.9 218.9   2. Adrenal nodule  E27.9 255.9   3. Calculus of gallbladder without cholecystitis without obstruction  K80.20 574.20          ED Disposition Condition    Discharge Stable             Husam Osman MD  07/13/25 0227       Husam Osman MD  07/13/25 0528